# Patient Record
Sex: MALE | Race: WHITE
[De-identification: names, ages, dates, MRNs, and addresses within clinical notes are randomized per-mention and may not be internally consistent; named-entity substitution may affect disease eponyms.]

---

## 2019-10-05 ENCOUNTER — HOSPITAL ENCOUNTER (EMERGENCY)
Dept: HOSPITAL 97 - ER | Age: 29
Discharge: HOME | End: 2019-10-05
Payer: SELF-PAY

## 2019-10-05 VITALS — SYSTOLIC BLOOD PRESSURE: 127 MMHG | DIASTOLIC BLOOD PRESSURE: 90 MMHG | TEMPERATURE: 97.2 F | OXYGEN SATURATION: 100 %

## 2019-10-05 DIAGNOSIS — F14.20: Primary | ICD-10-CM

## 2019-10-05 DIAGNOSIS — Z88.0: ICD-10-CM

## 2019-10-05 DIAGNOSIS — Z88.2: ICD-10-CM

## 2019-10-05 PROCEDURE — 99281 EMR DPT VST MAYX REQ PHY/QHP: CPT

## 2019-10-05 NOTE — EDPHYS
Physician Documentation                                                                           

 [facility]                                                                 

Name: Diogo Shea Jr                                                                              

Age: 28 yrs                                                                                       

Sex: Male                                                                                         

: 1990                                                                                   

MRN: [id number]                                                                                   

Arrival Date: 10/05/2019                                                                          

Time: 08:44                                                                                       

Account#: [id number]                                                                            

Bed 6                                                                                             

Private MD:                                                                                       

ED Physician Alfred Velasquez                                                                         

HPI:                                                                                              

10/05                                                                                             

09:19 This 28 yrs old  Male presents to ER via Ambulatory with complaints of Drug    rn  

      Withdrawal.                                                                                 

09:19 Reports has been using cocaine for about 3 years, increased usage for last few months   rn  

      due to break up, wants to get clean, last use today, feels fine currently but reports       

      intermittent shakes, diarrhea, and malaise. No focal neuro complaints. No fever. Denies     

      suicidal or homicidal thoughts. Called drug line and they recommended checking into         

      rehab, came here for evaluation. Reports doesn't feel like withdrawing but told to say      

      that by help line to try and get admitted. Just wants help quitting. . Onset: The           

      symptoms/episode began/occurred at an unknown time. Severity of symptoms: At their          

      worst the symptoms were mild in the emergency department the symptoms have improved.        

      The patient has experienced similar episodes in the past. The patient has not recently      

      seen a physician.                                                                           

                                                                                                  

Historical:                                                                                       

- Allergies:                                                                                      

09:02 PENICILLINS;                                                                            hb  

09:02 Sulfa (Sulfonamide Antibiotics);                                                        hb  

- Home Meds:                                                                                      

09:02 None [Active];                                                                          hb  

- PMHx:                                                                                           

09:02 None;                                                                                   hb  

- PSHx:                                                                                           

09:02 None;                                                                                   hb  

                                                                                                  

- Immunization history:: Adult Immunizations.                                                     

- Ebola Screening: : Patient denies travel to an Ebola-affected area in the 21 days               

  before illness onset.                                                                           

- Social history:: Smoking status: Patient/guardian denies using tobacco, Patient uses            

  alcohol, on a daily basis. street drugs, cocaine.                                               

- Family history:: not pertinent.                                                                 

- Hospitalizations: : No recent hospitalization is reported.                                      

                                                                                                  

                                                                                                  

ROS:                                                                                              

09:19 Constitutional: Negative for fever, chills, and weight loss, Eyes: Negative for injury, rn  

      pain, redness, and discharge, Neck: Negative for injury, pain, and swelling,                

      Cardiovascular: Negative for chest pain, palpitations, and edema, Respiratory: Negative     

      for shortness of breath, cough, wheezing, and pleuritic chest pain, Abdomen/GI:             

      Negative for abdominal pain, and constipation, MS/Extremity: Negative for injury and        

      deformity, Skin: Negative for injury, rash, and discoloration, Neuro: Negative for          

      headache, weakness, numbness, tingling, and seizure, Psych: Negative for suicide            

      ideation, homicidal ideation, and hallucinations.                                           

                                                                                                  

Exam:                                                                                             

09:19 Constitutional:  This is a well developed, well nourished patient who is awake, alert,  rn  

      and in no acute distress. Head/Face:  Normocephalic, atraumatic. Eyes:  Pupils equal        

      round and reactive to light, extra-ocular motions intact.  Lids and lashes normal.          

      Conjunctiva and sclera are non-icteric and not injected.  Cornea within normal limits.      

      Periorbital areas with no swelling, redness, or edema. ENT:  MMM Cardiovascular:            

      Regular rate and rhythm.  No pulse deficits. Respiratory:  No increased work of             

      breathing, no retractions or nasal flaring. Skin:  Warm, dry with normal turgor.            

      Normal color with no rashes, no lesions, and no evidence of cellulitis. MS/ Extremity:      

      Pulses equal, no cyanosis.  Neurovascular intact.  Full, normal range of motion.  Equal     

      circumference. Neuro:  Awake and alert, GCS 15, oriented to person, place, time, and        

      situation.  Cranial nerves II-XII grossly intact.  Motor strength 5/5 in all                

      extremities.  Sensory grossly intact.  Cerebellar exam normal.  Normal gait. Psych:         

      Awake, alert, with orientation to person, place and time.  Behavior, mood, and affect       

      are within normal limits.                                                                   

                                                                                                  

Vital Signs:                                                                                      

09:02  / 90; Pulse 84; Resp 16; Temp 97.2; Pulse Ox 100% ; Weight 102.06 kg; Height 6   hb  

      ft. 1 in. (185.42 cm); Pain 5/10;                                                           

09:02 Body Mass Index 29.68 (102.06 kg, 185.42 cm)                                            hb  

                                                                                                  

MDM:                                                                                              

08:57 Patient medically screened.                                                             rn  

09:19 Differential Diagnosis drug withdrawal, drug dependency . Data reviewed: vital signs,   rn  

      nurses notes, and as a result, I will discharge patient. Counseling: I had a detailed       

      discussion with the patient and/or guardian regarding: the historical points, exam          

      findings, and any diagnostic results supporting the discharge/admit diagnosis, the need     

      for outpatient follow up, to return to the emergency department if symptoms worsen or       

      persist or if there are any questions or concerns that arise at home. Special               

      discussion: I discussed with the patient/guardian in detail that at this point there is     

      no indication for admission to the hospital. It is understood, however, that if the         

      symptoms persist or worsen the patient needs to return immediately for re-evaluation.       

      ED course: Pt with normal vital signs, no signs or symptoms of withdrawal, just used        

      today, and no signs of acute intoxication either. Recommended cessation of drug use and     

      checking in to drug rehab. Medically screened because nothing we can do for this            

      gentleman here. Again, denies suicidal or homicidal ideation. .                             

                                                                                                  

Administered Medications:                                                                         

No medications were administered                                                                  

                                                                                                  

                                                                                                  

Disposition:                                                                                      

10/05/19 09:28 Discharged to Home as Medical Screen. Impression: Cocaine dependence, Cocaine      

  abuse, uncomplicated.                                                                           

- Condition is Stable.                                                                            

- Discharge Instructions: Chemical Dependency, Stimulant Use Disorder-Cocaine.                    

                                                                                                  

- Medication Reconciliation Form, Thank You Letter, Antibiotic Education, Prescription            

  Opioid Use form.                                                                                

- Follow up: Private Physician; When: As needed; Reason: Recheck today's complaints,              

  Re-evaluation by your physician.                                                                

- Problem is an ongoing problem.                                                                  

- Symptoms have improved.                                                                         

                                                                                                  

                                                                                                  

                                                                                                  

Signatures:                                                                                       

Alfred Velasquez MD MD rn Baxter, Heather, RN RN                                                      

Porsche Armstrong RN RN   tw2                                                  

                                                                                                  

Corrections: (The following items were deleted from the chart)                                    

09: 09:28 10/05/2019 09:28 Discharged to Home. Impression: Cocaine dependence; Cocaine      rn  

      abuse, uncomplicated. Condition is Stable. Forms are Medication Reconciliation Form,        

      Thank You Letter, Antibiotic Education, Prescription Opioid Use. Follow up: Private         

      Physician; When: As needed; Reason: Recheck today's complaints, Re-evaluation by your       

      physician. Problem is an ongoing problem. Symptoms have improved. rn                        

09:39 09:28 10/05/2019 09:28 Discharged to Home as Medical Screen. Impression: Cocaine        tw2 

      dependence; Cocaine abuse, uncomplicated. Condition is Stable. Discharge Instructions:      

      Chemical Dependency, Stimulant Use Disorder-Cocaine. Forms are Medication                   

      Reconciliation Form, Thank You Letter, Antibiotic Education, Prescription Opioid Use.       

      Follow up: Private Physician; When: As needed; Reason: Recheck today's complaints,          

      Re-evaluation by your physician. Problem is an ongoing problem. Symptoms have improved.     

      rn                                                                                          

                                                                                                  

**************************************************************************************************

## 2019-10-05 NOTE — ER
Nurse's Notes                                                                                     

 [facility]                                                                 

Name: Diogo Shea Jr                                                                              

Age: 28 yrs                                                                                       

Sex: Male                                                                                         

: 1990                                                                                   

MRN: [id number]                                                                                   

Arrival Date: 10/05/2019                                                                          

Time: 08:44                                                                                       

Account#: [id number]                                                                            

Bed 6                                                                                             

Private MD:                                                                                       

Diagnosis: Cocaine dependence;Cocaine abuse, uncomplicated                                        

                                                                                                  

Presentation:                                                                                     

10/05                                                                                             

08:57 Presenting complaint: Has been using 2 grams of cocaine per day for 6 months, last used hb  

      2 hrs PTA. Also reports being awake since 0330 yesterday, has consumed >20 beers            

      yesterday/last night.                                                                       

08:57 Transition of care: patient was not received from another setting of care. Onset of     hb  

      symptoms was 2019.                                                              

08:57 Method Of Arrival: Ambulatory                                                             

08:57 Acuity: BETSEY 3                                                                           hb  

08:58 Risk Assessment: Do you want to hurt yourself or someone else? Patient reports no       tw2 

      desire to harm self or others. Initial Sepsis Screen: Does the patient meet any 2           

      criteria? No. Patient's initial sepsis screen is negative. Does the patient have a          

      suspected source of infection? No. Patient's initial sepsis screen is negative. Care        

      prior to arrival: None.                                                                     

                                                                                                  

Historical:                                                                                       

- Allergies:                                                                                      

09:02 PENICILLINS;                                                                            hb  

09:02 Sulfa (Sulfonamide Antibiotics);                                                        hb  

- Home Meds:                                                                                      

09:02 None [Active];                                                                          hb  

- PMHx:                                                                                           

09:02 None;                                                                                   hb  

- PSHx:                                                                                           

09:02 None;                                                                                   hb  

                                                                                                  

- Immunization history:: Adult Immunizations.                                                     

- Ebola Screening: : Patient denies travel to an Ebola-affected area in the 21 days               

  before illness onset.                                                                           

- Social history:: Smoking status: Patient/guardian denies using tobacco, Patient uses            

  alcohol, on a daily basis. street drugs, cocaine.                                               

- Family history:: not pertinent.                                                                 

- Hospitalizations: : No recent hospitalization is reported.                                      

                                                                                                  

                                                                                                  

Screenin:58 Abuse screen: Denies threats or abuse. Nutritional screening: No deficits noted.        tw2 

      Tuberculosis screening: No symptoms or risk factors identified. Fall Risk None              

      identified.                                                                                 

                                                                                                  

Assessment:                                                                                       

09:03 General: Appears in no apparent distress. Behavior is calm, cooperative. Pain:          hb  

      Complains of pain in right arm and left arm Pain currently is 5 out of 10 on a pain         

      scale. Neuro: Level of Consciousness is awake, alert, obeys commands, Oriented to           

      person, place, time, situation. Cardiovascular: Heart tones S1 S2 present Capillary         

      refill < 3 seconds Patient's skin is warm and dry. Respiratory: Airway is patent            

      Respiratory effort is even, unlabored, Respiratory pattern is regular, symmetrical,         

      Breath sounds are clear bilaterally. GI: No signs and/or symptoms were reported             

      involving the gastrointestinal system. : No signs and/or symptoms were reported           

      regarding the genitourinary system. EENT: No signs and/or symptoms were reported            

      regarding the EENT system. Derm: Skin is intact, is healthy with good turgor, Skin is       

      pink, warm \T\ dry. Musculoskeletal: No signs and/or symptoms reported regarding the        

      musculoskeletal system.                                                                     

                                                                                                  

Vital Signs:                                                                                      

09:02  / 90; Pulse 84; Resp 16; Temp 97.2; Pulse Ox 100% ; Weight 102.06 kg; Height 6   hb  

      ft. 1 in. (185.42 cm); Pain 5/10;                                                           

09:02 Body Mass Index 29.68 (102.06 kg, 185.42 cm)                                            hb  

                                                                                                  

ED Course:                                                                                        

08:44 Patient arrived in ED.                                                                  as  

08:57 Alfred Velasquez MD is Attending Physician.                                                rn  

08:58 Arm band placed on.                                                                     tw2 

08:58 Bed in low position. Call light in reach.                                               tw2 

09:00 Susan Bernardo, RN is Primary Nurse.                                                   hb  

09:02 Triage completed.                                                                       hb  

09:37 No provider procedures requiring assistance completed. Patient did not have IV access   hb  

      during this emergency room visit.                                                           

                                                                                                  

Administered Medications:                                                                         

No medications were administered                                                                  

                                                                                                  

                                                                                                  

Outcome:                                                                                          

09:28 Discharge ordered by MD.                                                                rn  

09:37 Medical screen evaluation completed per provider. Patient declined treatment.           hb  

09:37 Condition: stable                                                                           

09:37 Instructed on follow up and referral plans. Demonstrated understanding of instructions.     

09:39 Patient left the ED.                                                                    tw2 

                                                                                                  

Signatures:                                                                                       

Mabel Melendez Roman, MD MD rn Baxter, Heather, RN                     [facility]                                                      

Porsche Armstrong RN RN   tw2                                                  

                                                                                                  

**************************************************************************************************

## 2020-01-11 ENCOUNTER — HOSPITAL ENCOUNTER (EMERGENCY)
Dept: HOSPITAL 97 - ER | Age: 30
Discharge: HOME | End: 2020-01-11
Payer: SELF-PAY

## 2020-01-11 VITALS — DIASTOLIC BLOOD PRESSURE: 88 MMHG | SYSTOLIC BLOOD PRESSURE: 120 MMHG

## 2020-01-11 VITALS — TEMPERATURE: 98.2 F

## 2020-01-11 DIAGNOSIS — Z88.0: ICD-10-CM

## 2020-01-11 DIAGNOSIS — Z72.0: ICD-10-CM

## 2020-01-11 DIAGNOSIS — Z88.2: ICD-10-CM

## 2020-01-11 DIAGNOSIS — F14.10: Primary | ICD-10-CM

## 2020-01-11 LAB
ALBUMIN SERPL BCP-MCNC: 5 G/DL (ref 3.4–5)
ALP SERPL-CCNC: 52 U/L (ref 45–117)
ALT SERPL W P-5'-P-CCNC: 60 U/L (ref 12–78)
AST SERPL W P-5'-P-CCNC: 22 U/L (ref 15–37)
BUN BLD-MCNC: 6 MG/DL (ref 7–18)
GLUCOSE SERPLBLD-MCNC: 136 MG/DL (ref 74–106)
HCT VFR BLD CALC: 46.4 % (ref 39.6–49)
INR BLD: 1.05
LYMPHOCYTES # SPEC AUTO: 1.4 K/UL (ref 0.7–4.9)
METHAMPHET UR QL SCN: NEGATIVE
PMV BLD: 9.1 FL (ref 7.6–11.3)
POTASSIUM SERPL-SCNC: 3.3 MMOL/L (ref 3.5–5.1)
RBC # BLD: 5.3 M/UL (ref 4.33–5.43)
THC SERPL-MCNC: NEGATIVE NG/ML
TROPONIN (EMERG DEPT USE ONLY): < 0.02 NG/ML (ref 0–0.04)
TSH SERPL DL<=0.05 MIU/L-ACNC: 2.21 UIU/ML (ref 0.36–3.74)

## 2020-01-11 PROCEDURE — 36415 COLL VENOUS BLD VENIPUNCTURE: CPT

## 2020-01-11 PROCEDURE — 80307 DRUG TEST PRSMV CHEM ANLYZR: CPT

## 2020-01-11 PROCEDURE — 84484 ASSAY OF TROPONIN QUANT: CPT

## 2020-01-11 PROCEDURE — 80329 ANALGESICS NON-OPIOID 1 OR 2: CPT

## 2020-01-11 PROCEDURE — 84443 ASSAY THYROID STIM HORMONE: CPT

## 2020-01-11 PROCEDURE — 93005 ELECTROCARDIOGRAM TRACING: CPT

## 2020-01-11 PROCEDURE — 96360 HYDRATION IV INFUSION INIT: CPT

## 2020-01-11 PROCEDURE — 80076 HEPATIC FUNCTION PANEL: CPT

## 2020-01-11 PROCEDURE — 85610 PROTHROMBIN TIME: CPT

## 2020-01-11 PROCEDURE — 85730 THROMBOPLASTIN TIME PARTIAL: CPT

## 2020-01-11 PROCEDURE — 99285 EMERGENCY DEPT VISIT HI MDM: CPT

## 2020-01-11 PROCEDURE — 85025 COMPLETE CBC W/AUTO DIFF WBC: CPT

## 2020-01-11 PROCEDURE — 80320 DRUG SCREEN QUANTALCOHOLS: CPT

## 2020-01-11 PROCEDURE — 80048 BASIC METABOLIC PNL TOTAL CA: CPT

## 2020-01-11 PROCEDURE — 81003 URINALYSIS AUTO W/O SCOPE: CPT

## 2020-01-11 NOTE — EDPHYS
Physician Documentation                                                                           

 [facility]                                                                 

Name: Diogo Shea Jr                                                                              

Age: 29 yrs                                                                                       

Sex: Male                                                                                         

: 1990                                                                                   

MRN: [id number]                                                                                   

Arrival Date: 2020                                                                          

Time: 13:29                                                                                       

Account#: [id number]                                                                            

Bed 26                                                                                            

Private MD: None, None                                                                            

ED Physician Alfred Velasquez                                                                         

HPI:                                                                                              

                                                                                             

13:41 This 29 yrs old  Male presents to ER via EMS with complaints of Drug Abuse.    jmm 

13:41 The patient presents with a history of heart racing. Onset: The symptoms/episode        jmm 

      began/occurred acutely, 1 hour(s) ago. Duration: The patient or guardian reports a          

      single episode, that is still ongoing, but improving. Modifying factors: The symptoms       

      are aggravated by substance abuse cocaine. Associated signs and symptoms: Pertinent         

      negatives: chest pain, SOB. This is a 29 year old male with nmo chronic medical             

      conditions that presents to the ED with complaints of palpitations beginning approx 1       

      hour ago. Patient admits to cocaine use. Denies abdominal pain, vomiting. .                 

                                                                                                  

Historical:                                                                                       

- Allergies:                                                                                      

13:33 PENICILLINS;                                                                            sg  

13:33 Sulfa (Sulfonamide Antibiotics);                                                        sg  

- PMHx:                                                                                           

13:33 None;                                                                                   sg  

- PSHx:                                                                                           

13:33 None;                                                                                   sg  

                                                                                                  

- Immunization history:: Adult Immunizations up to date.                                          

- Social history:: Smoking status: Patient uses tobacco products, Patient/guardian                

  denies using Cocaine ( on the weekends).                                                        

- Ebola Screening: : Patient negative for fever greater than or equal to 101.5 degrees            

  Fahrenheit, and additional compatible Ebola Virus Disease symptoms Patient denies               

  exposure to infectious person Patient denies travel to an Ebola-affected area in the            

  21 days before illness onset No symptoms or risks identified at this time.                      

                                                                                                  

                                                                                                  

ROS:                                                                                              

13:41 Constitutional: Negative for fever, chills, and weight loss.                            jmm 

13:41 Abdomen/GI: Negative for abdominal pain, nausea, vomiting, diarrhea, and constipation,      

      Back: Negative for injury and pain, Neuro: Negative for headache, weakness, numbness,       

      tingling, and seizure.                                                                      

13:41 Cardiovascular: Positive for palpitations.                                                  

13:41 All other systems are negative.                                                             

                                                                                                  

Exam:                                                                                             

13:41 Head/Face:  atraumatic. Eyes:  EOMI, no conjunctival erythema appreciated ENT:  Moist   jmm 

      Mucus Membranes Neck:  Trachea midline, Supple Chest/axilla:  Normal chest wall             

      appearance and motion.   Cardiovascular:  Regular rate and rhythm.  No edema                

      appreciated Respiratory:  Normal respirations, no respiratory distress appreciated          

      Abdomen/GI:  Non distended, soft Back:  Normal ROM Skin:  General appearance color          

      normal MS/ Extremity:  Moves all extremities, no obvious deformities appreciated, no        

      edema noted to the lower extremities  Neuro:  Awake and alert, normal gait Psych:           

      Behavior is normal, Mood is normal, Patient is cooperative and pleasant                     

13:41 Constitutional: The patient appears alert, awake, anxious.                                  

                                                                                                  

Vital Signs:                                                                                      

13:34  / 90; Pulse 113; Resp 16 S; Temp 97.7; Pulse Ox 100% ;                           sg  

13:59  / 86; Pulse 92; Resp 17; Pulse Ox 100% on R/A;                                   sg  

14:20  / 71; Pulse 87; Resp 16; Pulse Ox 98% on R/A;                                    sg  

14:30  / 71 LA (auto/lg); Pulse 89; Resp 18; Pulse Ox 99% on R/A;                       jp3 

15:02  / 76; Pulse 55; Resp 16 S; Pulse Ox 99% on R/A;                                  jp3 

15:34  / 88; Pulse 78; Resp 18; Pulse Ox 99% ;                                          jp3 

                                                                                                  

MDM:                                                                                              

13:39 Patient medically screened.                                                             [facility] 

15:55 Data reviewed: vital signs, nurses notes. Counseling: I had a detailed discussion with  shira 

      the patient and/or guardian regarding: the historical points, exam findings, and any        

      diagnostic results supporting the discharge/admit diagnosis, lab results, the need for      

      outpatient follow up, to return to the emergency department if symptoms worsen or           

      persist or if there are any questions or concerns that arise at home. ED course:            

      Patient is alert and non toxic in appearance in the ED. Patient states he feels much        

      better. Advised to d/c cocaine use. Patient otherwise given strict return precautions.      

      Patient understood and agrees with the plan of care. .                                      

                                                                                                  

                                                                                             

13:39 Order name: Acetaminophen; Complete Time: 15:03                                         [facility] 

                                                                                             

13:39 Order name: Basic Metabolic Panel; Complete Time: 15:03                                 [facility] 

                                                                                             

13:39 Order name: CBC with Diff; Complete Time: 14:36                                         [facility] 

                                                                                             

13:39 Order name: ETOH Level; Complete Time: 14:36                                            [facility] 

                                                                                             

13:39 Order name: Hepatic Function; Complete Time: 15:03                                      [facility] 

                                                                                             

13:39 Order name: PT-INR; Complete Time: 14:36                                                [facility] 

                                                                                             

13:39 Order name: Ptt, Activated; Complete Time: 14:36                                        [facility] 

                                                                                             

13:39 Order name: Salicylate; Complete Time: 14:36                                            [facility] 

                                                                                             

13:39 Order name: Urine Drug Screen; Complete Time: 15:16                                     [facility] 

                                                                                             

13:39 Order name: EKG; Complete Time: 13:40                                                   [facility] 

                                                                                             

13:39 Order name: Troponin (emerg Dept Use Only); Complete Time: 15:03                        [facility] 

                                                                                             

13:39 Order name: TSH; Complete Time: 15:03                                                   [facility] 

                                                                                             

14:58 Order name: Urine Dipstick--Ancillary (enter results); Complete Time: 15:22               

                                                                                             

13:39 Order name: EKG - Nurse/Tech; Complete Time: 14:01                                      [facility] 

                                                                                             

13:39 Order name: IV Saline Lock; Complete Time: 14:                                        [facility] 

                                                                                             

13:39 Order name: Labs collected and sent; Complete Time: 14:                               [facility] 

                                                                                             

13:39 Order name: Urine Dipstick-Ancillary (obtain specimen); Complete Time: 14:51            [facility] 

                                                                                                  

Administered Medications:                                                                         

14:00 Drug: NS 0.9% 1000 ml Route: IV; Rate: 1 bolus; Site: right antecubital;                sg  

15:00 Follow up: Response: No adverse reaction; IV Status: Completed infusion; IV Intake:     sg  

      1000ml                                                                                      

                                                                                                  

                                                                                                  

Disposition:                                                                                      

17:07 Co-signature as Attending Physician, Alfred Velasquez MD.                                    rn  

                                                                                                  

Disposition:                                                                                      

20 15:56 Discharged to Home. Impression: Palpitations, Drug Abuse.                          

- Condition is Stable.                                                                            

- Discharge Instructions: Stimulant Use Disorder-Cocaine, Palpitations.                           

                                                                                                  

- Medication Reconciliation Form, Thank You Letter, Antibiotic Education, Prescription            

  Opioid Use form.                                                                                

- Follow up: Private Physician; When: 2 - 3 days; Reason: Recheck today's complaints,             

  Continuance of care, Re-evaluation by your physician.                                           

                                                                                                  

                                                                                                  

                                                                                                  

Signatures:                                                                                       

Dispatcher [employer]                           Ponce Velasco RN                         RN   sg                                                   

Niraj Wadsworth PA PA jmm Nieto, Roman, MD MD   rn                                                   

                                                                                                  

Corrections: (The following items were deleted from the chart)                                    

16:05 15:56 2020 15:56 Discharged to Home. Impression: Palpitations; Drug Abuse.        sg  

      Condition is Stable. Forms are Medication Reconciliation Form, Thank You Letter,            

      Antibiotic Education, Prescription Opioid Use. Follow up: Private Physician; When: 2 -      

      3 days; Reason: Recheck today's complaints, Continuance of care, Re-evaluation by your      

      physician. shira                                                                              

                                                                                                  

**************************************************************************************************

## 2020-01-11 NOTE — XMS REPORT
Patient Summary Document

 Created on:2020



Patient:GERMÁN THRASHER

Sex:Male

:1990

External Reference #:[id number]





Demographics







 Address  [address] [place] [address]

 

 Home Phone  [phone]

 

 Work Phone  [phone]

 

 Email Address  NONE

 

 Preferred Language  Unknown

 

 Marital Status  Unknown

 

 Protestant Affiliation  Unknown

 

 Race  Unknown

 

 Additional Race(s)  Unavailable

 

 Ethnic Group  Unknown









Author







 Organization  [facility]

 

 Address  [address] [place] [address] [address]



   [place] [address]

 

 Phone  [phone]









Care Team Providers







 Name  Role  Phone

 

 Unavailable  Unavailable  Unavailable









Problems

This patient has no known problems.



Allergies, Adverse Reactions, Alerts

This patient has no known allergies or adverse reactions.



Medications

This patient has no known medications.

## 2020-01-11 NOTE — ER
Nurse's Notes                                                                                     

 [facility]                                                                 

Name: Diogo Shea Jr                                                                              

Age: 29 yrs                                                                                       

Sex: Male                                                                                         

: 1990                                                                                   

MRN: [id number]                                                                                   

Arrival Date: 2020                                                                          

Time: 13:29                                                                                       

Account#: [id number]                                                                            

Bed 26                                                                                            

Private MD: None, None                                                                            

Diagnosis: Palpitations;Drug Abuse                                                                

                                                                                                  

Presentation:                                                                                     

                                                                                             

13:30 Presenting complaint: EMS states: pt repoted doing 1/2 of an 8 sack with a friend,      sg  

      reports feeling his heart rate in his chest, reports feeling anxioius. EMS state last       

      ingestion of cocaine occurred around 0800 this morning per the pt. Transition of care:      

      patient was not received from another setting of care. Onset of symptoms was 2020. Risk Assessment: Do you want to hurt yourself or someone else? Patient            

      reports no desire to harm self or others. Initial Sepsis Screen: Does the patient meet      

      any 2 criteria? HR > 90 bpm. Does the patient have a suspected source of infection? No.     

      Patient's initial sepsis screen is negative. Care prior to arrival: None.                   

13:30 Method Of Arrival: EMS: [facility]                                                      

13:30 Acuity: BETSEY 2                                                                           sg  

                                                                                                  

Historical:                                                                                       

- Allergies:                                                                                      

13:33 PENICILLINS;                                                                            sg  

13:33 Sulfa (Sulfonamide Antibiotics);                                                        sg  

- PMHx:                                                                                           

13:33 None;                                                                                   sg  

- PSHx:                                                                                           

13:33 None;                                                                                   sg  

                                                                                                  

- Immunization history:: Adult Immunizations up to date.                                          

- Social history:: Smoking status: Patient uses tobacco products, Patient/guardian                

  denies using Cocaine ( on the weekends).                                                        

- Ebola Screening: : Patient negative for fever greater than or equal to 101.5 degrees            

  Fahrenheit, and additional compatible Ebola Virus Disease symptoms Patient denies               

  exposure to infectious person Patient denies travel to an Ebola-affected area in the            

  21 days before illness onset No symptoms or risks identified at this time.                      

                                                                                                  

                                                                                                  

Screenin:33 Abuse screen: Denies threats or abuse. Denies injuries from another. Nutritional        sg  

      screening: No deficits noted. Tuberculosis screening: No symptoms or risk factors           

      identified. Fall Risk None identified.                                                      

                                                                                                  

Assessment:                                                                                       

13:33 General: Appears in no apparent distress. well groomed, well developed, well nourished. sg  

      Pain: Denies pain. Neuro: Level of Consciousness is awake, alert, obeys commands,           

      Oriented to person, place, time,  are equal bilaterally Speech is normal, Facial       

      symmetry appears normal. Cardiovascular: Patient's skin is warm and dry. Chest pain is      

      denied. Cardiovascular: Reports palpitations. Respiratory: Airway is patent Respiratory     

      effort is even, unlabored, Respiratory pattern is regular, symmetrical. GI: No signs        

      and/or symptoms were reported involving the gastrointestinal system. : No signs           

      and/or symptoms were reported regarding the genitourinary system. EENT: No signs and/or     

      symptoms were reported regarding the EENT system. Derm: Skin is pink, warm \T\ dry.         

      Musculoskeletal: Circulation, motion, and sensation intact. Range of motion: intact in      

      all extremities.                                                                            

                                                                                                  

Vital Signs:                                                                                      

13:34  / 90; Pulse 113; Resp 16 S; Temp 97.7; Pulse Ox 100% ;                           sg  

13:59  / 86; Pulse 92; Resp 17; Pulse Ox 100% on R/A;                                   sg  

14:20  / 71; Pulse 87; Resp 16; Pulse Ox 98% on R/A;                                    sg  

14:30  / 71 LA (auto/lg); Pulse 89; Resp 18; Pulse Ox 99% on R/A;                       jp3 

15:02  / 76; Pulse 55; Resp 16 S; Pulse Ox 99% on R/A;                                  jp3 

15:34  / 88; Pulse 78; Resp 18; Pulse Ox 99% ;                                          jp3 

                                                                                                  

ED Course:                                                                                        

13:29 Patient arrived in ED.                                                                  sg  

13:30 None, None is Private Physician.                                                        sg  

13[facility]32 [facility]                                                              [facility] 

13:32 Alfred Velasquez MD is Attending Physician.                                                [facility] 

13:32 Triage completed.                                                                       sg  

13:32 Arm band placed on.                                                                     sg  

13:40 EKG done, by ED staff, reviewed by Niraj GLEZ. Patient maintains SpO2 saturation   jp3 

      greater than 95% on room air.                                                               

13:42 Safety checks: Family/friend present: yes. Bed in low position. Call light in reach.    jp3 

      Side rails up X 1. Verbal reassurance given. Cardiac monitor on. Pulse ox on. NIBP on.      

13:59 Ponce Dickerson, RN is Primary Nurse.                                                       sg  

14:00 Initial lab(s) drawn, by me, sent to lab. Inserted saline lock: 20 gauge in right       jp3 

      antecubital area, using aseptic technique. Blood collected.                                 

14:01 Warm blanket given.                                                                     jp3 

14:57 Urine collected: clean catch specimen, clear, anne colored, Legal drug screen obtained jp3 

      per protocol.                                                                               

14:57 Urine Drug Screen Sent.                                                                 jp3 

16:04 Removal of peripheral IV. Catheter intact, dressing applied.                            jp3 

                                                                                                  

Administered Medications:                                                                         

14:00 Drug: NS 0.9% 1000 ml Route: IV; Rate: 1 bolus; Site: right antecubital;                sg  

15:00 Follow up: Response: No adverse reaction; IV Status: Completed infusion; IV Intake:     sg  

      1000ml                                                                                      

                                                                                                  

                                                                                                  

Intake:                                                                                           

15:00 IV: 1000ml; Total: 1000ml.                                                              sg  

                                                                                                  

Outcome:                                                                                          

15:56 Discharge ordered by MD.                                                                shira 

16:05 Patient left the ED.                                                                    sg  

                                                                                                  

Signatures:                                                                                       

oPnce Dickerson RN                         RN   Niraj Pineda PA PA jmm Pisarski, Jacob                              jp3                                                  

                                                                                                  

**************************************************************************************************

## 2020-01-12 VITALS — OXYGEN SATURATION: 99 %

## 2020-01-12 NOTE — EKG
Test Date:    2020-01-11               Test Time:    13:35:37

Technician:   BRIAN                                    

                                                     

MEASUREMENT RESULTS:                                       

Intervals:                                           

Rate:         108                                    

UT:           164                                    

QRSD:         100                                    

QT:           340                                    

QTc:          455                                    

Axis:                                                

P:            61                                     

UT:           164                                    

QRS:          27                                     

T:            63                                     

                                                     

INTERPRETIVE STATEMENTS:                                       

                                                     

Sinus tachycardia

Nonspecific T wave abnormality

Abnormal ECG

No previous ECG available for comparison



Electronically Signed On 01-12-20 06:34:03 CST by Bowen Danielle

## 2020-07-26 ENCOUNTER — HOSPITAL ENCOUNTER (EMERGENCY)
Dept: HOSPITAL 97 - ER | Age: 30
LOS: 1 days | Discharge: HOME | End: 2020-07-27
Payer: SELF-PAY

## 2020-07-26 DIAGNOSIS — F14.10: Primary | ICD-10-CM

## 2020-07-26 DIAGNOSIS — Z88.0: ICD-10-CM

## 2020-07-26 DIAGNOSIS — F17.210: ICD-10-CM

## 2020-07-26 DIAGNOSIS — Z88.2: ICD-10-CM

## 2020-07-26 LAB
HCT VFR BLD CALC: 43.5 % (ref 39.6–49)
INR BLD: 0.91
LYMPHOCYTES # SPEC AUTO: 1.4 K/UL (ref 0.7–4.9)
PMV BLD: 9.2 FL (ref 7.6–11.3)
RBC # BLD: 4.95 M/UL (ref 4.33–5.43)

## 2020-07-26 PROCEDURE — 80307 DRUG TEST PRSMV CHEM ANLYZR: CPT

## 2020-07-26 PROCEDURE — 83880 ASSAY OF NATRIURETIC PEPTIDE: CPT

## 2020-07-26 PROCEDURE — 84484 ASSAY OF TROPONIN QUANT: CPT

## 2020-07-26 PROCEDURE — 80048 BASIC METABOLIC PNL TOTAL CA: CPT

## 2020-07-26 PROCEDURE — 36415 COLL VENOUS BLD VENIPUNCTURE: CPT

## 2020-07-26 PROCEDURE — 85025 COMPLETE CBC W/AUTO DIFF WBC: CPT

## 2020-07-26 PROCEDURE — 83735 ASSAY OF MAGNESIUM: CPT

## 2020-07-26 PROCEDURE — 93005 ELECTROCARDIOGRAM TRACING: CPT

## 2020-07-26 PROCEDURE — 81003 URINALYSIS AUTO W/O SCOPE: CPT

## 2020-07-26 PROCEDURE — 99285 EMERGENCY DEPT VISIT HI MDM: CPT

## 2020-07-26 PROCEDURE — 85610 PROTHROMBIN TIME: CPT

## 2020-07-26 PROCEDURE — 80076 HEPATIC FUNCTION PANEL: CPT

## 2020-07-26 PROCEDURE — 71045 X-RAY EXAM CHEST 1 VIEW: CPT

## 2020-07-26 NOTE — XMS REPORT
Clinical Summary

                            Created on:2020



Patient:Diogo Shea

Sex:Male

:1990

External Reference #:[id number]





Demographics







                          Address                   [address] [address]

 

                          Home Phone                [phone]

 

                          Email Address             [email or web address]

 

                          Preferred Language        English

 

                          Marital Status            Single

 

                          Restoration Affiliation     Unknown

 

                          Race                      Unknown

 

                          Ethnic Group              Not  or 









Author







                          Organization              [facility]

 

                          Address                   [address] [place] [id number]









Support







                Name            Relationship    Address         Phone

 

                Contact No      Unavailable     Unavailable     [phone]









Care Team Providers







                    Name                Role                Phone

 

                    Doris Green MD Primary Care Provider [phone]









Allergies







             Active Allergy Reactions    Severity     Noted Date   Comments

 

             Penicillins  Swelling                  04/10/2017   

 

             Sulfa (Sulfonamide Antibiotics) Anaphylaxis  High         04/10/201

7   







Medications







          Medication Sig       Dispensed Refills   Start Date End Date  Status

 

          lamoTRIgine (LaMICtal) Take 25 mg by           0                      

       Active



          25 MG tablet mouth 2 (two)                                         



                    times a day.                                         

 

          venlafaxine (EFFEXOR) Take 50 mg by           0                       

      Active



          50 MG tablet mouth 2 (two)                                         



                    times a day.                                         







Active Problems







                          Problem                   Noted Date

 

                          Weakness                  04/10/2017

 

                          Chronic bilateral low back pain without sciatica 04/10

/2017

 

                          Bipolar depression        







Family History







                Medical History Relation        Name            Comments

 

                No Known Problems Brother                         

 

                Hypertension    Father                          

 

                Fibromyalgia    Mother                          

 

                Other           Sister                          car accident









                Relation        Name            Status          Comments

 

                Brother                         Alive           

 

                Father                          Alive           

 

                Mother                          Alive           

 

                Sister                                  







Social History







             Tobacco Use  Types        Packs/Day    Years Used   Date

 

             Never Smoker                                        









                Smokeless Tobacco: Never Used                                 









                Alcohol Use     Drinks/Week     oz/Week         Comments

 

                Yes                                             









                          Sex Assigned at Birth     Date Recorded

 

                          Not on file               









                    Job Start Date      Occupation          Industry

 

                    Not on file         Not on file         Not on file









                    Travel History      Travel Start        Travel End









                                        No recent travel history available.







Last Filed Vital Signs

Not on file



Plan of Treatment







                Health Maintenance Due Date        Last Done       Comments

 

                INFLUENZA VACCINE 2020                      







Results

Not on fileafter 2019



Insurance







          Payer     Benefit Plan / Group Subscriber ID Effective Dates Phone    

 Address   Type

 

          [facility] x[facility] 2017-Present                 

    HMO/PPO



                    CHOICE/CHOICE +                                         









           Guarantor Name Account Type Relation to Date of    Phone      Billing

 Address



                                 Patient    Birth                 

 

           Diogo Shea Personal/Family Self       1990 [phone]

[place]



                                                       (Home)     [address]



                                                                  [id number]







Advance Directives

For more information, please contact: [phone]





                Type            Date Recorded   Patient Representative Explanati

on

 

                Advance Directives, Living Will and                             

    



                Medical Power of

## 2020-07-26 NOTE — XMS REPORT
Summary of Care

                             Created on:July 3, 2020



Patient:Diogo Shea Jr.

Sex:Male

:1990

External Reference #:[id number]





Demographics







                          Address                   [address]



                                                    [place] [address]

 

                          Mobile Phone              [phone]

 

                          Home Phone                [phone]

 

                          Work Phone                [phone]

 

                          Email Address             [email or web address]

 

                          Preferred Language        English

 

                          Marital Status            Single

 

                          Jew Affiliation     Unknown

 

                          Race                      White

 

                          Ethnic Group              Not  or 









Author







                          Organization              [facility]

 

                          Address                   [address] [place] [address]









Support







                Name            Relationship    Address         Phone

 

                Zulma Shea   Unavailable     [address] [facility] [employer] [phone]



                                                [place] [address] 









Care Team Providers







                    Name                Role                Phone

 

                    Pcp,  Does Not Have A Primary Care Provider [phone]









Reason for Visit







                          Reason                    Comments

 

                          LAB                       







Encounter Details







             Date         Type         Department   Care Team    Description

 

                2020      Laboratory Only [facility] FAMILY Ross Ortega MD



[address] 



[place] [address] [phone] (Fax)                      Suspected 2019 [facility]



                                                            MEDICINE



                                        



Nurse, [facility]        Coronavirus



                                       [address]              Infection ([facility]



                                                            [address]IVE, [address]



                                                    Dx)



                                       [address]              



                                                            [address]



                                                    



                                       [place]              



                                                            [address] [phone]              







Allergies







             Active Allergy Reactions    Severity     Noted Date   Comments

 

             Penicillins  Swelling                  2015   

 

             Sulfa (Sulfonamide Antibiotics) Swelling                  

5   

 

             Hydrocodone-Acetaminophen Swelling                  2015   



documented as of this encounter (statuses as of 2020)



Medications







          Medication Sig       Dispensed Refills   Start Date End Date  Status

 

          lamoTRIgine 25 mg Take 25 mg by           0                           

  Active



          tablet    mouth 3 (three)                                         



                    times daily.                                         

 

          venlafaxine 37.5 mg Take 37.5 mg by           0                       

      Active



          tablet    mouth every                                         



                    morning.                                          

 

          traMADol (ULTRAM) 50 mg Take 1 tablet by 9 tablet  0         

9           Active



          tabletIndications: mouth every 8                                      

   



          Toothache (eight) hours as                                         



                    needed for Pain                                         



                    (scale 4-6).                                         



documented as of this encounter (statuses as of 2020)



Active Problems

No known active problemsdocumented as of this encounter (statuses as of 
2020)



Social History







             Tobacco Use  Types        Packs/Day    Years Used   Date

 

             Never Assessed                                        









                          Sex Assigned at Birth     Date Recorded

 

                          Not on file               









                    Job Start Date      Occupation          Industry

 

                    Not on file         Not on file         Not on file









                    Travel History      Travel Start        Travel End









                                        No recent travel history available.









                    COVID-19 Exposure   Response            Date Recorded

 

                    In the last month, have you been in contact with No / Unsure

         7/3/2020  4:12 PM 

CDT



                    someone who was confirmed or suspected to have              

       



                    Coronavirus / COVID-19?                     



documented as of this encounter



Last Filed Vital Signs

Not on filedocumented in this encounter



Plan of Treatment







             Name         Type         Priority     Associated Diagnoses Order S

chedusonny

 

             COVID-19 (PCR MOLECULAR LAB          Routine      Suspected 2019 No

sujatha Expected: 2020,



             TESTING)                               Coronavirus Infection 

s: 2021









                Health Maintenance Due Date        Last Done       Comments

 

                VARICELLA VACCINES (1 of 2 - 1993                      



                2-dose childhood series)                                 

 

                DTaP,Tdap,and Td Vaccines (1 - 2003                      



                Tdap)                                           

 

                Depression Screening 2004                      

 

                INFLUENZA VACCINE (#1) 2020                      

 

                PNEUMOCOCCAL 0-64 YEARS COMBINED Aged Out                       

 No longer eligible based on



                SERIES                                          patient's age to

 complete this



                                                                topic



documented as of this encounter



Results

Not on filedocumented in this encounter



Visit Diagnoses







                                        Diagnosis

 

                                        Suspected  Novel Coronavirus Infecti

on - Primary



documented in this encounter



Additional Health Concerns







                Infection       Onset Date      Last Indicated  Resolved Time

 

                COVID-19 Rule Out 2020      



documented as of this encounter

## 2020-07-26 NOTE — XMS REPORT
Continuity of Care Document

                            Created on:2020



Patient:GERMÁN THRASHER

Sex:Male

:1990

External Reference #:[id number]





Demographics







                          Address                   [address]



                                                    [place] [address]

 

                          Home Phone                [phone]

 

                          Work Phone                [phone]

 

                          Mobile Phone              [phone]

 

                          Email Address             [email or web address]

 

                          Preferred Language        English

 

                          Marital Status            Unknown

 

                          Sabianism Affiliation     000

 

                          Race                      Unknown

 

                          Additional Race(s)        Unavailable



                                                    White

 

                          Ethnic Group              Not  or 









Author







                          Organization              [facility]

t

 

                          Address                   [address]



                                                    [place] [address]

 

                          Phone                     [phone]









Support







                Name            Relationship    Address         Phone

 

                Shabbir         Mother          [address] [phone]



                                                [place] [address] 

 

                No              Unknown         Unavailable     [phone]









Care Team Providers







                    Name                Role                Phone

 

                    Peter SHIRLEY,  Dennys   Primary Care Physician [phone]

 

                    Nurse,  Pcp [facility] Attending Clinician Unavailabl

e









Problems







       Condition Condition Condition Status Onset  Resolution Last   Treating Co

mments 

Source



       Name   Details Category        Date   Date   Treatment Clinician        



                                                 Date                 

 

       Weakness Weakness Disease Active                              [facility]

on



                                   410                               Methodi



                                   00:00:                             st



                                   00                                 

 

       Chronic Chronic Disease Active                              [place]



       bilateral bilateral               -10                               Meth

oswaldo



       low back low back               00:00:                             st



       pain   pain                 00                                 



       without without                                                  



       sciatica sciatica                                                  

 

       Bipolar Bipolar Disease Active                                    [place]



       depression depression                                                  Me





                                                                      st







Allergies, Adverse Reactions, Alerts







       Allergy Allergy Status Severity Reaction(s) Onset  Inactive Treating Comm

ents 

Source



       Name   Type                        Date   Date   Clinician        

 

       Penicill Propensi Active        Swelling                       [facility]

ton



       ins    ty to                       4-10                        Methodi



              adverse                      00:00:                      st



              reaction                      00                          



              s to                                                    



              drug                                                    

 

       Sulfa  Propensi Active        Anaphylaxis                       Aw[facility]

ston



       (Sulfona ty to                       4-10                        Methodi



       mide   adverse                      00:00:                      st



       Antibiot reaction                      00                          



       ics)   s to                                                    



              drug                                                    







Family History







           Family Member Diagnosis  Comments   Start Date Stop Date  Source

 

           Natural brother No Known Problems                                  [facility]

 

           Natural father Hypertension                                  [place] 

[facility]

 

           Natural mother Fibromyalgia                                  [place] 

[facility]

 

           [facility]                                       [facility]







Social History







           Social Habit Start Date Stop Date  Quantity   Comments   Source

 

           Sex Assigned At                                             [facility]

ethodist



           Birth                                                  

 

           Alcohol intake 2017-04-10 2017-04-10 Current drinker            [facility]

on [facility]



                      00:00:00   00:00:00   of alcohol            



                                            (finding)             









                Smoking Status  Start Date      Stop Date       Source

 

                Never smoker                                    [facility]

t







Medications







       Ordered Filled Start  Stop   Current Ordering Indication Dosage Frequency

 Signature

                    Comments            Components          Source



     Medication Medication Date Date Medication? Clinician                (SIG) 

          



     Name Name                                                   

 

     lamoTRIgine      2017-0      Yes            25mg Q.5D Take 25 mg           

Gonzales



     (LaMICtal)      4-10                               by mouth 2           Met

hodi



     25 MG      15:15:                               (two)           st



     tablet      06                                 times a           



                                                  day.           

 

     venlafaxine      2017-0      Yes            50mg Q.5D Take 50 mg           

[place]



     (EFFEXOR)      4-10                               by mouth 2           Meth

oswaldo



     50 MG      15:15:                               (two)           st



     tablet      06                                 times a           



                                                  day.           







Procedures

This patient has no known procedures.



Plan of Care







             Planned Activity Planned Date Details      Comments     Source

 

             Future Scheduled 2020   INFLUENZA VACCINE              Velma Singh



             Test         00:00:00     [code = INFLUENZA              



                                       VACCINE]                  







Encounters







        Start   End     Encounter Admission Attending Care    Care    Encounter 

Source



        Date/Time Date/Time Type    Type    Clinicians Facility Department ID   

   

 

        2020 Laboratory         Nurse, [facility]    [phone] 

[id number] 



        14:40:23 14:55:23 Only            Pcp     PRIMARY [id number]         



                                        Assessment CARE    [id number]         



                                        Clinic  [place] [phone]         



                                                        042             







Results

This patient has no known results.

## 2020-07-27 VITALS — OXYGEN SATURATION: 99 %

## 2020-07-27 VITALS — TEMPERATURE: 98.3 F | DIASTOLIC BLOOD PRESSURE: 63 MMHG | SYSTOLIC BLOOD PRESSURE: 111 MMHG

## 2020-07-27 LAB
ALBUMIN SERPL BCP-MCNC: 4.1 G/DL (ref 3.4–5)
ALP SERPL-CCNC: 68 U/L (ref 45–117)
ALT SERPL W P-5'-P-CCNC: 38 U/L (ref 12–78)
AST SERPL W P-5'-P-CCNC: 24 U/L (ref 15–37)
BUN BLD-MCNC: 17 MG/DL (ref 7–18)
GLUCOSE SERPLBLD-MCNC: 112 MG/DL (ref 74–106)
MAGNESIUM SERPL-MCNC: 2.3 MG/DL (ref 1.8–2.4)
METHAMPHET UR QL SCN: NEGATIVE
NT-PROBNP SERPL-MCNC: 6 PG/ML (ref ?–125)
POTASSIUM SERPL-SCNC: 3.5 MMOL/L (ref 3.5–5.1)
THC SERPL-MCNC: NEGATIVE NG/ML
TROPONIN (EMERG DEPT USE ONLY): < 0.02 NG/ML (ref 0–0.04)

## 2020-07-27 NOTE — RAD REPORT
EXAM DESCRIPTION:  RAD - Chest Single View - 7/27/2020 12:17 am

 

CLINICAL HISTORY:  PALPITATIONS, tachycardia

 

COMPARISON:  None none

 

TECHNIQUE:  AP portable chest image was obtained 7/27/2020 12:17 am .

 

FINDINGS:  Lungs are clear. Heart and vasculature are normal. No measurable pleural effusion and no p
neumothorax. No acute bony abnormality seen. No acute aortic findings suspected.

 

IMPRESSION:  No acute cardiopulmonary process.

## 2020-07-27 NOTE — EDPHYS
Physician Documentation                                                                           

 [facility]                                                                 

Name: Diogo Shea Jr                                                                              

Age: 29 yrs                                                                                       

Sex: Male                                                                                         

: 1990                                                                                   

MRN: [id number]                                                                                   

Arrival Date: 2020                                                                          

Time: 22:12                                                                                       

Account#: [id number]                                                                            

Bed 20                                                                                            

Private MD:                                                                                       

ED Physician Ramon Nevarez                                                                     

HPI:                                                                                              

                                                                                             

02:37 This 29 yrs old  Male presents to ER via Ambulatory with complaints of         tw4 

      Irregular Pulse.                                                                            

02:37 The patient presents with a history of heart skipping beats. Context: The symptoms      tw4 

      occur without known cause. Onset: The symptoms/episode began/occurred today. Duration:      

      The patient or guardian reports multiple episodes, that wax and wane. Modifying             

      factors: The symptoms are aggravated by substance abuse cocaine, The symptoms are           

      alleviated by nothing. Associated signs and symptoms: Pertinent positives: SOB.             

      Severity of symptoms: At their worst the symptoms were mild in the emergency department     

      the symptoms have improved. The patient has not experienced similar symptoms in the         

      past.                                                                                       

                                                                                                  

Historical:                                                                                       

- Allergies:                                                                                      

                                                                                             

22:24 PENICILLINS;                                                                            lp1 

22:24 Sulfa (Sulfonamide Antibiotics);                                                        lp1 

- Home Meds:                                                                                      

22:24 None [Active];                                                                          lp1 

- PMHx:                                                                                           

22:24 None;                                                                                   lp1 

- PSHx:                                                                                           

22:24 None;                                                                                   lp1 

                                                                                                  

- Immunization history:: Adult Immunizations up to date.                                          

- Social history:: Smoking status: Patient reports the use of cigarette tobacco                   

  products, denies chronic smoking, but will smoke occasionally, Patient uses alcohol,            

  occasionally. street drugs, cocaine.                                                            

                                                                                                  

                                                                                                  

ROS:                                                                                              

                                                                                             

02:37 Constitutional: Negative for fever, chills, and weight loss, Eyes: Negative for injury, tw4 

      pain, redness, and discharge, Respiratory: Negative for shortness of breath, cough,         

      wheezing, and pleuritic chest pain, Abdomen/GI: Negative for abdominal pain, nausea,        

      vomiting, diarrhea, and constipation, Back: Negative for injury and pain, MS/Extremity:     

      Negative for injury and deformity, Skin: Negative for injury, rash, and discoloration,      

      Neuro: Negative for headache, weakness, numbness, tingling, and seizure.                    

      Cardiovascular: Positive for palpitations, Negative for chest pain, edema, orthopnea,       

      paroxysmal nocturnal dyspnea.                                                               

                                                                                                  

Exam:                                                                                             

02:37 Constitutional:  This is a well developed, well nourished patient who is awake, alert,  tw4 

      and in no acute distress. Head/Face:  Normocephalic, atraumatic. Chest/axilla:  Normal      

      chest wall appearance and motion.  Nontender with no deformity.  No lesions are             

      appreciated. Cardiovascular:  Regular rate and rhythm with a normal S1 and S2.  No          

      gallops, murmurs, or rubs.  Normal PMI, no JVD.  No pulse deficits. Respiratory:  Lungs     

      have equal breath sounds bilaterally, clear to auscultation and percussion.  No rales,      

      rhonchi or wheezes noted.  No increased work of breathing, no retractions or nasal          

      flaring. Abdomen/GI:  Soft, non-tender, with normal bowel sounds.  No distension or         

      tympany.  No guarding or rebound.  No evidence of tenderness throughout. Back:  No          

      spinal tenderness.  No costovertebral tenderness.  Full range of motion. MS/ Extremity:     

       Pulses equal, no cyanosis.  Neurovascular intact.  Full, normal range of motion.           

      Neuro:  Awake and alert, GCS 15, oriented to person, place, time, and situation.            

      Cranial nerves II-XII grossly intact.  Motor strength 5/5 in all extremities.  Sensory      

      grossly intact.  Cerebellar exam normal.  Normal gait.                                      

                                                                                                  

Vital Signs:                                                                                      

                                                                                             

22:22  / 88; Pulse 100; Resp 18; Temp 98.1(O); Pulse Ox 100% on R/A; Weight 102.06 kg   lp1 

      (R); Height 6 ft. 1 in. (185.42 cm); Pain 0/10;                                             

23:21  / 79; Pulse 76; Resp 18; Temp 98.1; Pulse Ox 98% on R/A; Pain 4/10;              ks7 

23:57  / 79; Pulse 64; Resp 18; Temp 98.1(TE); Pulse Ox 98% on R/A; Pain 0/10;          ks7 

                                                                                             

01:00  / 67; Pulse 64; Resp 18; Temp 98.2; Pulse Ox 99% ;                               ds4 

02:00  / 63; Pulse 66; Resp 18; Temp 98.3; Pulse Ox 99% ;                               ds4 

                                                                                             

22:22 Body Mass Index 29.68 (102.06 kg, 185.42 cm)                                            lp1 

                                                                                                  

MDM:                                                                                              

00:37 Patient medically screened.                                                             tw4 

02:39 Differential diagnosis: arrythmia, dehydration, stress disorder. Data reviewed: vital   tw4 

      signs, nurses notes. Data reviewed: lab test result(s), cardiac enzymes, CBC,               

      electrolytes, hepatic panel, EKG, radiologic studies, plain films. Data interpreted:        

      Pulse oximetry: Interpretation: normal. Test interpretation: by ED physician or             

      midlevel provider: ECG, plain radiologic studies. Counseling: I had a detailed              

      discussion with the patient and/or guardian regarding: the historical points, exam          

      findings, and any diagnostic results supporting the discharge/admit diagnosis, lab          

      results, radiology results. Special discussion: I discussed with the patient/guardian       

      in detail that at this point there is no indication for admission to the hospital. It       

      is understood, however, that if the symptoms persist or worsen the patient needs to         

      return immediately for re-evaluation.                                                       

                                                                                                  

                                                                                             

23:17 Order name: Basic Metabolic Panel; Complete Time: :                                                                                             

01:56 Interpretation: Normal except: GLUC 112; CRE 1.34; GFR 64.                                                                                                                           

23:17 Order name: CBC with Diff; Complete Time:                                                                                              

01:56 Interpretation: Normal except: EOSINOPHIL % 5.5.                                                                                                                                     

23:17 Order name: LFT's; Complete Time:                                                                                              

01:57 Interpretation: Within normal limits.                                                                                                                                                

23:17 Order name: Magnesium; Complete Time: :                                                                                             

01:57 Interpretation: Within normal limits: MG 2.3.                                                                                                                                        

23:17 Order name: NT PRO-BNP; Complete Time: :                                                                                             

01:57 Interpretation: Within normal limits: NT PRO-BNP 6.                                                                                                                                  

23:17 Order name: PT-INR; Complete Time: :                                                                                             

01:57 Interpretation: Within normal limits: PT 10.7.                                                                                                                                       

22:55 Order name: EKG - Nurse/Tech; Complete Time: 23:10                                      ar5 

                                                                                             

23:17 Order name: Troponin (emerg Dept Use Only); Complete Time: :                                                                                             

01:57 Interpretation: Within normal limits: TROPED < 0.02.                                                                                                                                 

23:17 Order name: XRAY Chest (1 view)                                                                                                                                                      

23:17 Order name: Cardiac monitoring; Complete Time: 23:22                                    tw4 

                                                                                             

23:18 Order name: Urine Drug Screen; Complete Time: 01:56                                                                                                                                  

01:56 Interpretation: Normal except: VIKY POSITIVE.                                                                                                                                         

23:24 Order name: Urine Dipstick--Ancillary (enter results)                                   4 

                                                                                             

23:17 Order name: IV Saline Lock; Complete Time: 23:56                                        4 

                                                                                             

23:17 Order name: Labs collected and sent; Complete Time: 23:56                               4 

                                                                                             

23:17 Order name: O2 Per Protocol; Complete Time: 23:23                                       4 

                                                                                             

23:17 Order name: O2 Sat Monitoring; Complete Time: 23:22                                      

                                                                                                  

EC:37 Rate is 81 beats/min. Rhythm is regular. QRS Axis is Normal. MA interval is normal. QRS tw4 

      interval is normal. QT interval is normal. No Q waves. T waves are Flattened in leads       

      III, V6. No ST changes noted. Clinical impression: NSR w/ Non-specific ST/T Changes.        

      Interpreted by me. Reviewed by me.                                                          

                                                                                                  

Administered Medications:                                                                         

No medications were administered                                                                  

                                                                                                  

                                                                                                  

Disposition:                                                                                      

20 02:14 Discharged to Home. Impression: Palpitations, Cocaine abuse.                       

- Condition is Stable.                                                                            

- Discharge Instructions: Palpitations, Substance Use Disorder.                                   

                                                                                                  

- Medication Reconciliation Form, Thank You Letter, Antibiotic Education, Prescription            

  Opioid Use form.                                                                                

- Follow up: Private Physician; When: Upon discharge from the Emergency Department;               

  Reason: Recheck today's complaints, Continuance of care, Re-evaluation by your                  

  physician.                                                                                      

- Problem is new.                                                                                 

- Symptoms have improved.                                                                         

                                                                                                  

                                                                                                  

                                                                                                  

Signatures:                                                                                       

Dispatcher [employer]                           EDEmily Chacon RN                         RN   lp1                                                  

Ramon Nevarez MD MD   tw4                                                  

Cecily Sandhu                               ar5                                                  

                                                                                                  

Corrections: (The following items were deleted from the chart)                                    

02:48 02:14 2020 02:14 Discharged to Home. Impression: Palpitations; Cocaine abuse.     lp1 

      Condition is Stable. Forms are Medication Reconciliation Form, Thank You Letter,            

      Antibiotic Education, Prescription Opioid Use. Follow up: Private Physician; When: Upon     

      discharge from the Emergency Department; Reason: Recheck today's complaints,                

      Continuance of care, Re-evaluation by your physician. Problem is new. Symptoms have         

      improved. tw4                                                                               

                                                                                                  

**************************************************************************************************

## 2020-07-27 NOTE — ER
Nurse's Notes                                                                                     

 [facility]                                                                 

Name: Diogo Shea Jr                                                                              

Age: 29 yrs                                                                                       

Sex: Male                                                                                         

: 1990                                                                                   

MRN: [id number]                                                                                   

Arrival Date: 2020                                                                          

Time: 22:12                                                                                       

Account#: [id number]                                                                            

Bed 20                                                                                            

Private MD:                                                                                       

Diagnosis: Palpitations;Cocaine abuse                                                             

                                                                                                  

Presentation:                                                                                     

                                                                                             

22:22 Chief complaint: Patient states: Patient states going to bed and felt like heart began  lp1 

      beating fast; States using cocaine and ETOH yesterday; Denies N/V. Coronavirus screen:      

      Patient denies a cough. Patient denies shortness of breath or difficulty breathing.         

      Patient denies measured and/or subjective temperature greater than 100.4F prior to          

      today's visit. Patient denies travel on a cruise ship or to a country the [facility] currently     

      lists as an affected area. Patient denies contact with known and/or suspected case of       

      COVID-19. Proceed with normal triage. Ebola Screen: No symptoms or risks identified at      

      this time. Initial Sepsis Screen: Does the patient meet any 2 criteria? No. Patient's       

      initial sepsis screen is negative. Does the patient have a suspected source of              

      infection? No. Patient's initial sepsis screen is negative. Risk Assessment: Do you         

      want to hurt yourself or someone else? Patient reports no desire to harm self or            

      others. Onset of symptoms was 2020 at 20:00.                                       

22:22 Method Of Arrival: Ambulatory                                                           lp1 

22:22 Acuity: BETSEY 3                                                                           lp1 

                                                                                                  

Triage Assessment:                                                                                

23:21 General: Appears in no apparent distress. pt used cocaine tonight, also having anxiety  ks7 

      d/t stress from having to leave his wife and son bc he joined the [employer]. in ED pt SR         

      70-80 but he states he feels like he having heart palpitations and has a cramping           

      feeling on the left lower part of his chest under his L nipple. aaox4 ambulatory..          

23:21 Pain: Complains of pain in chest, left lower Pain currently is 4 out of 10 on a pain    ks7 

      scale. Quality of pain is described as crampy, Pain began 1 hour ago. Is intermittent.      

      Cardiovascular: No deficits noted. Heart tones S1 S2 present Rhythm is regular.             

                                                                                             

02:00 General: Behavior is calm.                                                              lp1 

                                                                                                  

Historical:                                                                                       

- Allergies:                                                                                      

                                                                                             

22:24 PENICILLINS;                                                                            lp1 

22:24 Sulfa (Sulfonamide Antibiotics);                                                        lp1 

- Home Meds:                                                                                      

22:24 None [Active];                                                                          lp1 

- PMHx:                                                                                           

22:24 None;                                                                                   lp1 

- PSHx:                                                                                           

22:24 None;                                                                                   lp1 

                                                                                                  

- Immunization history:: Adult Immunizations up to date.                                          

- Social history:: Smoking status: Patient reports the use of cigarette tobacco                   

  products, denies chronic smoking, but will smoke occasionally, Patient uses alcohol,            

  occasionally. street drugs, cocaine.                                                            

                                                                                                  

                                                                                                  

Screenin:26 Abuse screen: Denies threats or abuse. Denies injuries from another. Nutritional        lp1 

      screening: No deficits noted. Tuberculosis screening: No symptoms or risk factors           

      identified. Fall Risk None identified.                                                      

                                                                                                  

Assessment:                                                                                       

23:25 Pain: Complains of pain in chest Pain does not radiate. Pain Quality of pain is         ks7 

      described as crampy.                                                                        

                                                                                             

00:09 Reassessment: Patient is alert, oriented x 3, equal unlabored respirations, skin        ks7 

      warm/dry/pink. Patient states feeling better. Pain: Is intermittent, lasting a few          

      seconds.                                                                                    

01:00 Reassessment: Patient appears in no apparent distress at this time. Patient and/or      lp1 

      family updated on plan of care and expected duration. Pain level reassessed. Patient is     

      alert, oriented x 3, equal unlabored respirations, skin warm/dry/pink.                      

02:46 Reassessment: Patient is alert, oriented x 3, equal unlabored respirations, skin        lp1 

      warm/dry/pink. Patient demonstrates understanding of discharge instructions Patient         

      denies pain at this time. Patient states feeling better. Patient states symptoms have       

      improved.                                                                                   

                                                                                                  

Vital Signs:                                                                                      

                                                                                             

22:22  / 88; Pulse 100; Resp 18; Temp 98.1(O); Pulse Ox 100% on R/A; Weight 102.06 kg   lp1 

      (R); Height 6 ft. 1 in. (185.42 cm); Pain 0/10;                                             

23:21  / 79; Pulse 76; Resp 18; Temp 98.1; Pulse Ox 98% on R/A; Pain 4/10;              ks7 

23:57  / 79; Pulse 64; Resp 18; Temp 98.1(TE); Pulse Ox 98% on R/A; Pain 0/10;          ks7 

                                                                                             

01:00  / 67; Pulse 64; Resp 18; Temp 98.2; Pulse Ox 99% ;                               ds4 

02:00  / 63; Pulse 66; Resp 18; Temp 98.3; Pulse Ox 99% ;                               ds4 

                                                                                             

22:22 Body Mass Index 29.68 (102.06 kg, 185.42 cm)                                            1 

                                                                                                  

ED Course:                                                                                        

                                                                                             

22:12 Patient arrived in ED.                                                                  ds1 

22:23 Triage completed.                                                                       lp1 

22:23 Arm band placed on.                                                                     lp1 

23:00 Ramon Nevarez MD is Attending Physician.                                            tw4 

23:01 Christa Frey, RN is Primary Nurse.                                                ks7 

23:22 Urine Drug Screen Sent.                                                                 ds4 

23:25 Resting quietly. ED physician to see patient.                                           ks7 

23:25 Patient has correct armband on for positive identification. Placed in gown. Bed in low  ks7 

      position. Call light in reach. Side rails up X2. Cardiac monitor on. Pulse ox on. NIBP      

      on.                                                                                         

23:25 No provider procedures requiring assistance completed. Patient maintains SpO2           ks7 

      saturation greater than 95% on room air.                                                    

23:47 Inserted saline lock: 20 gauge in right antecubital area, using aseptic technique.      4 

      Blood collected.                                                                            

23:56 Basic Metabolic Panel Sent.                                                             ks7 

23:56 CBC with Diff Sent.                                                                     ks7 

23:56 LFT's Sent.                                                                             ks7 

23:56 Magnesium Sent.                                                                         ks7 

23:56 NT PRO-BNP Sent.                                                                        ks7 

23:56 PT-INR Sent.                                                                            ks7 

23:56 XRAY Chest (1 view) Sent.                                                               ks7 

07                                                                                             

00:27 XRAY Chest (1 view) In Process Unspecified.                                             EDMS

02:47 IV discontinued, No redness/swelling at site. Pressure dressing applied.                lp1 

                                                                                                  

Administered Medications:                                                                         

No medications were administered                                                                  

                                                                                                  

                                                                                                  

Outcome:                                                                                          

02:14 Discharge ordered by MD.                                                                tw4 

02:47 Discharged to home ambulatory.                                                          lp1 

02:47 Condition: good                                                                             

02:47 Discharge instructions given to patient, Instructed on discharge instructions, follow       

      up and referral plans. Demonstrated understanding of instructions, follow-up care.          

02:48 Patient left the ED.                                                                    lp1 

                                                                                                  

Signatures:                                                                                       

Dispatcher [employer]                           EDMS                                                 

Kianna Coello                                1                                                  

Emily Manuel RN                         RN   1                                                  

Haim Graham                             4                                                  

Ramon Nevarez MD MD   [facility]                                                  

Carlos Davey                                 [facility]                                                  

Christa Frey, RN                  RN   ks7                                                  

                                                                                                  

Corrections: (The following items were deleted from the chart)                                    

00:11  23:57  / 79; Pulse 64bpm; Resp 18bpm; Pulse Ox 98% RA; Temp 98F Temporal;   ks7 

      Pain 0/10; ks7                                                                              

                                                                                                  

**************************************************************************************************

## 2020-08-03 ENCOUNTER — HOSPITAL ENCOUNTER (EMERGENCY)
Dept: HOSPITAL 97 - ER | Age: 30
Discharge: HOME | End: 2020-08-03
Payer: SELF-PAY

## 2020-08-03 VITALS — SYSTOLIC BLOOD PRESSURE: 121 MMHG | DIASTOLIC BLOOD PRESSURE: 70 MMHG

## 2020-08-03 VITALS — TEMPERATURE: 98.3 F | OXYGEN SATURATION: 100 %

## 2020-08-03 DIAGNOSIS — Z88.2: ICD-10-CM

## 2020-08-03 DIAGNOSIS — R11.10: Primary | ICD-10-CM

## 2020-08-03 DIAGNOSIS — Z88.0: ICD-10-CM

## 2020-08-03 LAB
ALBUMIN SERPL BCP-MCNC: 4.5 G/DL (ref 3.4–5)
ALP SERPL-CCNC: 53 U/L (ref 45–117)
ALT SERPL W P-5'-P-CCNC: 33 U/L (ref 12–78)
AST SERPL W P-5'-P-CCNC: 15 U/L (ref 15–37)
BUN BLD-MCNC: 10 MG/DL (ref 7–18)
GLUCOSE SERPLBLD-MCNC: 94 MG/DL (ref 74–106)
HCT VFR BLD CALC: 44.9 % (ref 39.6–49)
LYMPHOCYTES # SPEC AUTO: 1.2 K/UL (ref 0.7–4.9)
MAGNESIUM SERPL-MCNC: 2.4 MG/DL (ref 1.8–2.4)
PMV BLD: 9.4 FL (ref 7.6–11.3)
POTASSIUM SERPL-SCNC: 3.9 MMOL/L (ref 3.5–5.1)
RBC # BLD: 5.06 M/UL (ref 4.33–5.43)
TROPONIN (EMERG DEPT USE ONLY): < 0.02 NG/ML (ref 0–0.04)

## 2020-08-03 PROCEDURE — 36415 COLL VENOUS BLD VENIPUNCTURE: CPT

## 2020-08-03 PROCEDURE — 93005 ELECTROCARDIOGRAM TRACING: CPT

## 2020-08-03 PROCEDURE — 84484 ASSAY OF TROPONIN QUANT: CPT

## 2020-08-03 PROCEDURE — 80076 HEPATIC FUNCTION PANEL: CPT

## 2020-08-03 PROCEDURE — 71045 X-RAY EXAM CHEST 1 VIEW: CPT

## 2020-08-03 PROCEDURE — 80048 BASIC METABOLIC PNL TOTAL CA: CPT

## 2020-08-03 PROCEDURE — 85025 COMPLETE CBC W/AUTO DIFF WBC: CPT

## 2020-08-03 PROCEDURE — 85379 FIBRIN DEGRADATION QUANT: CPT

## 2020-08-03 PROCEDURE — 99285 EMERGENCY DEPT VISIT HI MDM: CPT

## 2020-08-03 PROCEDURE — 83735 ASSAY OF MAGNESIUM: CPT

## 2020-08-03 PROCEDURE — 81003 URINALYSIS AUTO W/O SCOPE: CPT
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
## 2020-08-03 NOTE — XMS REPORT
Continuity of Care Document

                            Created on:August 3, 2020



Patient:GERMÁN THRASHER

Sex:Male

:1990

External Reference #:[id number]





Demographics







                          Address                   [address]



                                                    [place] [address]

 

                          Home Phone                [phone]

 

                          Work Phone                [phone]

 

                          Mobile Phone              [phone]

 

                          Email Address             [email or web address]

 

                          Preferred Language        English

 

                          Marital Status            Unknown

 

                          Temple Affiliation     000

 

                          Race                      Unknown

 

                          Additional Race(s)        White



                                                    Unavailable

 

                          Ethnic Group              Not  or 









Author







                          Organization              [facility]

t

 

                          Address                   [address]



                                                    [place] [address]

 

                          Phone                     [phone]









Support







                Name            Relationship    Address         Phone

 

                Shabbir         Mother          [address] [phone]

[phone]



                                                [place] [address] 

 

                No              Unknown         Unavailable     [phone]









Care Team Providers







                    Name                Role                Phone

 

                    Peter SHIRLEY,  Dennys   Primary Care Physician [phone]

 

                    Nurse,  Pcp Assessment [facility] Attending Clinician Unavailabl

e









Problems







       Condition Condition Condition Status Onset  Resolution Last   Treating Co

mments 

Source



       Name   Details Category        Date   Date   Treatment Clinician        



                                                 Date                 

 

       Weakness Weakness Disease Active                              [facility]

on



                                   410                               Methodi



                                   00:00:                             st



                                   00                                 

 

       Chronic Chronic Disease Active                              Gl[place]win



       bilateral bilateral               -10                               Meth

oswaldo



       low back low back               00:00:                             st



       pain   pain                 00                                 



       without without                                                  



       sciatica sciatica                                                  

 

       Bipolar Bipolar Disease Active                                    Gl[place]win



       depression depression                                                  Me





                                                                      st







Allergies, Adverse Reactions, Alerts







       Allergy Allergy Status Severity Reaction(s) Onset  Inactive Treating Comm

ents 

Source



       Name   Type                        Date   Date   Clinician        

 

       Penicill Propensi Active        Swelling                       [facility]ous

ton



       ins    ty to                       4-10                        Methodi



              adverse                      00:00:                      st



              reaction                      00                          



              s to                                                    



              drug                                                    

 

       Sulfa  Propensi Active        Anaphylaxis                       Awilda

ston



       (Sulfona ty to                       4-10                        Methodi



       mide   adverse                      00:00:                      st



       Antibiot reaction                      00                          



       ics)   s to                                                    



              drug                                                    







Family History







           Family Member Diagnosis  Comments   Start Date Stop Date  Source

 

           Natural brother No Known Problems                                  [facility]

 

           Natural father Hypertension                                  [place] 

[facility]

 

           Natural mother Fibromyalgia                                  [place] 

[facility]

 

           [facility]







Social History







           Social Habit Start Date Stop Date  Quantity   Comments   Source

 

           Sex Assigned At                                             [facility]

ethodist



           Birth                                                  

 

           Alcohol intake 2017-04-10 2017-04-10 Current drinker            [place]

on [facility]



                      00:00:00   00:00:00   of alcohol            



                                            (finding)             









                Smoking Status  Start Date      Stop Date       Source

 

                Never smoker                                    [facility]

t







Medications







       Ordered Filled Start  Stop   Current Ordering Indication Dosage Frequency

 Signature

                    Comments            Components          Source



     Medication Medication Date Date Medication? Clinician                (SIG) 

          



     Name Name                                                   

 

     lamoTRIgine      2017-0      Yes            25mg Q.5D Take 25 mg           

[facility]



     (LaMICtal)      4-10                               by mouth 2           Met

hodi



     25 MG      15:15:                               (two)           st



     tablet      06                                 times a           



                                                  day.           

 

     venlafaxine      2017-0      Yes            50mg Q.5D Take 50 mg           

[facility]



     (EFFEXOR)      4-10                               by mouth 2           Meth

oswaldo



     50 MG      15:15:                               (two)           st



     tablet      06                                 times a           



                                                  day.           







Procedures

This patient has no known procedures.



Plan of Care







             Planned Activity Planned Date Details      Comments     Source

 

             Future Scheduled 2020   INFLUENZA VACCINE              Velma Singh



             Test         00:00:00     [code = INFLUENZA              



                                       VACCINE]                  







Encounters







        Start   End     Encounter Admission Attending Care    Care    Encounter 

Source



        Date/Time Date/Time Type    Type    Clinicians Facility Department ID   

   

 

        2020 Laboratory         Nurse, Sascha [facility]    [id number] 

[id number] 



        14:40:23 14:55:23 Only            Pcp     PRIMARY [id number]         



                                        Assessment CARE    [id number]         



                                        Clinic  [place] [id number]         



                                                        042             







Results

This patient has no known results.

## 2020-08-03 NOTE — RAD REPORT
EXAM DESCRIPTION:  Leda Single View8/3/2020 5:25 pm

 

CLINICAL HISTORY:  Chest pain

 

COMPARISON:  7/26/2020

 

FINDINGS:   The lungs appear clear of acute infiltrate. The heart is normal size

 

IMPRESSION:   No acute abnormalities displayed

## 2020-08-03 NOTE — ER
Nurse's Notes                                                                                     

 [facility]                                                                 

Name: Diogo Shea Jr                                                                              

Age: 29 yrs                                                                                       

Sex: Male                                                                                         

: 1990                                                                                   

MRN: [id number]                                                                                   

Arrival Date: 2020                                                                          

Time: 15:02                                                                                       

Account#: [id number]                                                                            

Bed 19                                                                                            

Private MD:                                                                                       

Diagnosis: Other chest pain-pleurtic, vomiting                                                    

                                                                                                  

Presentation:                                                                                     

                                                                                             

15:23 Chief complaint: Patient states: Sore chest since yesterday afternoon. Woke up today    ca1 

      with sharp chest pain and SOB. An hour PTA, dizziness, drowsy. Denies cough.                

      Coronavirus screen: Client denies travel out of the [place] in the last 14 days. At this       

      time, the client does not indicate any symptoms associated with coronavirus-19. Ebola       

      Screen: Patient negative for fever greater than or equal to 101.5 degrees Fahrenheit,       

      and additional compatible Ebola Virus Disease symptoms Patient denies exposure to           

      infectious person. Patient denies travel to an Ebola-affected area in the 21 days           

      before illness onset. No symptoms or risks identified at this time. Initial Sepsis          

      Screen: Does the patient meet any 2 criteria? No. Patient's initial sepsis screen is        

      negative. Does the patient have a suspected source of infection? No. Patient's initial      

      sepsis screen is negative. Risk Assessment: Do you want to hurt yourself or someone         

      else? Patient reports no desire to harm self or others. Onset of symptoms was 2020.                                                                                   

15:23 Method Of Arrival: Ambulatory                                                           ca1 

15:23 Acuity: BETSEY 3                                                                           ca1 

                                                                                                  

Historical:                                                                                       

- Allergies:                                                                                      

15:28 PENICILLINS;                                                                            ca1 

15:28 Sulfa (Sulfonamide Antibiotics);                                                        ca1 

- Home Meds:                                                                                      

15:28 None [Active];                                                                          ca1 

- PMHx:                                                                                           

15:28 None;                                                                                   ca1 

- PSHx:                                                                                           

15:28 None;                                                                                   ca1 

                                                                                                  

- Immunization history:: Adult Immunizations up to date.                                          

- Social history:: Smoking status: Patient denies any tobacco usage or history of.                

                                                                                                  

                                                                                                  

Screenin:45 Abuse screen: Denies threats or abuse. Denies injuries from another. Nutritional        jr10

      screening: No deficits noted. Tuberculosis screening: No symptoms or risk factors           

      identified. Fall Risk None identified.                                                      

                                                                                                  

Assessment:                                                                                       

17:45 Reassessment: Pt presents to the ED from home with c/o anterior chest wall pain that    jr10

      started yesterday. States that Saturday he was on a family trip and had too much            

      alcohol to drink and believes that he aspirated in his sleep. Reports chest pain that       

      started yesterday that is achy in nature and worse with movement and deep inspiration.      

      Denies any sob at present, appears in NAD. Pt denies any cardiac hx. General: Appears       

      in no apparent distress. Behavior is calm, cooperative, appropriate for age. Pain:          

      Complains of pain in chest Pain does not radiate. Pain currently is 6 out of 10 on a        

      pain scale. Quality of pain is described as aching, throbbing, Pain began 1 day ago. Is     

      continuous. Neuro: No deficits noted. Cardiovascular: Reports chest pain, Denies            

      lightheadedness, nausea, shortness of breath, vomiting, Capillary refill < 3 seconds        

      Pulses are all present. Edema is absent. Rhythm is regular. Respiratory: No deficits        

      noted. Reports pain with respiration Airway is patent Respiratory effort is even,           

      unlabored, Respiratory pattern is regular, symmetrical, Breath sounds are clear             

      bilaterally. the patient has mild shortness of breath. GI: No deficits noted. Abdomen       

      is round non-distended, Bowel sounds present X 4 quads. Patient currently denies            

      diarrhea, nausea, vomiting. : No deficits noted. EENT: No deficits noted. Derm: No        

      deficits noted. Musculoskeletal: No deficits noted.                                         

18:14 Reassessment: upon entering room to start IV fluids, pt states "is it ok if I don't     jr10

      have the fluids, I have really bad anxiety and I'm already thinking the worse and I         

      think the idea of the IV and the feeling I'm going to get I just don't think I'll be        

      able to handle it." Pt appears anxious at this time, states that he started feeling         

      "funny" after the initial saline lock admin. Reports hx of anxiety but denies taking        

      medications at present.                                                                     

                                                                                                  

Vital Signs:                                                                                      

15:23  / 83; Pulse 66; Resp 16 S; Temp 98.3(O); Pulse Ox 100% on R/A; Weight 104.33 kg  ca1 

      (R); Height 6 ft. 1 in. (185.42 cm) (R); Pain 3/10;                                         

18:52  / 70; Pulse 58; Resp 18; Pulse Ox 100% on R/A;                                   jr10

15:23 Body Mass Index 30.34 (104.33 kg, 185.42 cm)                                            ca1 

                                                                                                  

ED Course:                                                                                        

15:02 Patient arrived in ED.                                                                  as  

15:27 Triage completed.                                                                       ca1 

15:28 Arm band placed on right wrist.                                                         ca1 

15:43 EKG completed in triage. Results shown to MD.                                           ca1 

16:31 Tamela Ruth, RN is Primary Nurse.                                                   jr10

17:05 Markus Benitez MD is Attending Physician.                                             [facility] 

17:19 Inserted saline lock: 20 gauge in right antecubital area, using aseptic technique.      sv  

      Blood collected. Flushed right antecubital with 5 ml normal saline.                         

17:20 X-ray(s) taken.                                                                         sv  

17:25 XRAY Chest (1 view) In Process Unspecified.                                             EDMS

17:45 Patient has correct armband on for positive identification. Bed in low position. Call   jr10

      light in reach. Side rails up X2. Cardiac monitor on. Pulse ox on. NIBP on.                 

19:01 No provider procedures requiring assistance completed. IV discontinued, No              jr10

      redness/swelling at site. Pressure dressing applied. Patient maintains SpO2 saturation      

      greater than 95% on room air.                                                               

                                                                                                  

Administered Medications:                                                                         

17:52 Drug: Aspirin 162 mg Route: PO;                                                         jr10

18:53 Follow up: Response: No adverse reaction                                                jr10

17:59 Not Given (Patient Refused): NS 0.9% 1000 ml IV at 1 bolus Per protocol; 1000 mL bolus  jr10

18:51 Not Given (Patient Refused): Thiamine 100 mg IV at per protocol once                    jr10

                                                                                                  

                                                                                                  

Outcome:                                                                                          

18:28 Discharge ordered by MD.                                                                [facility] 

18:53 Discharged to home ambulatory.                                                          jr10

18:53 Condition: good                                                                             

18:53 Condition: good                                                                             

18:53 Discharge instructions given to patient, Instructed on discharge instructions, follow       

      up and referral plans. Demonstrated understanding of instructions, follow-up care,          

      medications, Prescriptions given X 2.                                                       

19:02 Patient left the ED.                                                                    jr10

                                                                                                  

Signatures:                                                                                       

Dispatcher [employer]                           EDMS                                                 

Jasmyne Banks, Markus Mohr RN, MD MD cha Martinez, Amelia as Acob, Cheryl RN                        RN   Tamela Galindo, SUE                     RN   jr10                                                 

                                                                                                  

**************************************************************************************************

## 2020-08-03 NOTE — XMS REPORT
Clinical Summary

                            Created on:August 3, 2020



Patient:Diogo Shea

Sex:Male

:1990

External Reference #:[id number]





Demographics







                          Address                   [address] [address]

 

                          Home Phone                [phone]

 

                          Email Address             [email or web address]

 

                          Preferred Language        English

 

                          Marital Status            Single

 

                          Quaker Affiliation     Unknown

 

                          Race                      Unknown

 

                          Ethnic Group              Not  or 









Author







                          Organization              [facility]

 

                          Address                   [address] [place] [id number]









Support







                Name            Relationship    Address         Phone

 

                Contact No      Unavailable     Unavailable     [phone]









Care Team Providers







                    Name                Role                Phone

 

                    Doris Green MD Primary Care Provider [phone]









Allergies







             Active Allergy Reactions    Severity     Noted Date   Comments

 

             Penicillins  Swelling                  04/10/2017   

 

             Sulfa (Sulfonamide Antibiotics) Anaphylaxis  High         04/10/201

7   







Medications







          Medication Sig       Dispensed Refills   Start Date End Date  Status

 

          lamoTRIgine (LaMICtal) Take 25 mg by           0                      

       Active



          25 MG tablet mouth 2 (two)                                         



                    times a day.                                         

 

          venlafaxine (EFFEXOR) Take 50 mg by           0                       

      Active



          50 MG tablet mouth 2 (two)                                         



                    times a day.                                         







Active Problems







                          Problem                   Noted Date

 

                          Weakness                  04/10/2017

 

                          Chronic bilateral low back pain without sciatica 04/10

/2017

 

                          Bipolar depression        







Family History







                Medical History Relation        Name            Comments

 

                No Known Problems Brother                         

 

                Hypertension    Father                          

 

                Fibromyalgia    Mother                          

 

                Other           Sister                          car accident









                Relation        Name            Status          Comments

 

                Brother                         Alive           

 

                Father                          Alive           

 

                Mother                          Alive           

 

                Sister                                  







Social History







             Tobacco Use  Types        Packs/Day    Years Used   Date

 

             Never Smoker                                        









                Smokeless Tobacco: Never Used                                 









                Alcohol Use     Drinks/Week     oz/Week         Comments

 

                Yes                                             









                          Sex Assigned at Birth     Date Recorded

 

                          Not on file               









                    Job Start Date      Occupation          Industry

 

                    Not on file         Not on file         Not on file









                    Travel History      Travel Start        Travel End









                                        No recent travel history available.







Last Filed Vital Signs

Not on file



Plan of Treatment







                Health Maintenance Due Date        Last Done       Comments

 

                INFLUENZA VACCINE 2020                      







Results

Not on fileafter 2019



Insurance







          Payer     Benefit Plan / Group Subscriber ID Effective Dates Phone    

 Address   Type

 

          [facility] xxxxxxxxx 2017-Present                 

    HMO/PPO



                    CHOICE/CHOICE +                                         









           Guarantor Name Account Type Relation to Date of    Phone      Billing

 Address



                                 Patient    Birth                 

 

           Diogo Shea Personal/Family Self       1990 [phone]

[place]



                                                       (Home)     [address]



                                                                  [id number]







Advance Directives

For more information, please contact: [phone]





                Type            Date Recorded   Patient Representative Explanati

on

 

                Advance Directives, Living Will and                             

    



                Medical Power of

## 2020-08-04 NOTE — EKG
Test Date:    2020-08-03               Test Time:    15:41:50

Technician:   CA                                     

                                                     

MEASUREMENT RESULTS:                                       

Intervals:                                           

Rate:         59                                     

ME:           154                                    

QRSD:         94                                     

QT:           378                                    

QTc:          374                                    

Axis:                                                

P:            26                                     

ME:           154                                    

QRS:          26                                     

T:            34                                     

                                                     

INTERPRETIVE STATEMENTS:                                       

                                                     

Sinus bradycardia

Otherwise normal ECG

Compared to ECG 07/26/2020 23:06:08

Sinus rhythm no longer present

T-wave abnormality no longer present



Electronically Signed On 08-04-20 11:01:50 CDT by Otis Quintero

## 2020-11-25 ENCOUNTER — HOSPITAL ENCOUNTER (EMERGENCY)
Dept: HOSPITAL 97 - ER | Age: 30
LOS: 1 days | Discharge: HOME | End: 2020-11-26
Payer: SELF-PAY

## 2020-11-25 DIAGNOSIS — Z88.2: ICD-10-CM

## 2020-11-25 DIAGNOSIS — F43.10: ICD-10-CM

## 2020-11-25 DIAGNOSIS — E87.6: ICD-10-CM

## 2020-11-25 DIAGNOSIS — K21.00: Primary | ICD-10-CM

## 2020-11-25 DIAGNOSIS — Z88.0: ICD-10-CM

## 2020-11-25 PROCEDURE — 85025 COMPLETE CBC W/AUTO DIFF WBC: CPT

## 2020-11-25 PROCEDURE — 80076 HEPATIC FUNCTION PANEL: CPT

## 2020-11-25 PROCEDURE — 36415 COLL VENOUS BLD VENIPUNCTURE: CPT

## 2020-11-25 PROCEDURE — 93005 ELECTROCARDIOGRAM TRACING: CPT

## 2020-11-25 PROCEDURE — 83880 ASSAY OF NATRIURETIC PEPTIDE: CPT

## 2020-11-25 PROCEDURE — 96374 THER/PROPH/DIAG INJ IV PUSH: CPT

## 2020-11-25 PROCEDURE — 71045 X-RAY EXAM CHEST 1 VIEW: CPT

## 2020-11-25 PROCEDURE — 83735 ASSAY OF MAGNESIUM: CPT

## 2020-11-25 PROCEDURE — 84484 ASSAY OF TROPONIN QUANT: CPT

## 2020-11-25 PROCEDURE — 99285 EMERGENCY DEPT VISIT HI MDM: CPT

## 2020-11-25 PROCEDURE — 80048 BASIC METABOLIC PNL TOTAL CA: CPT

## 2020-11-25 PROCEDURE — 85610 PROTHROMBIN TIME: CPT

## 2020-11-26 VITALS — DIASTOLIC BLOOD PRESSURE: 78 MMHG | SYSTOLIC BLOOD PRESSURE: 124 MMHG | TEMPERATURE: 98.2 F

## 2020-11-26 VITALS — OXYGEN SATURATION: 98 %

## 2020-11-26 LAB
ALBUMIN SERPL BCP-MCNC: 4.2 G/DL (ref 3.4–5)
ALP SERPL-CCNC: 65 U/L (ref 45–117)
ALT SERPL W P-5'-P-CCNC: 24 U/L (ref 12–78)
AST SERPL W P-5'-P-CCNC: 17 U/L (ref 15–37)
BUN BLD-MCNC: 10 MG/DL (ref 7–18)
GLUCOSE SERPLBLD-MCNC: 99 MG/DL (ref 74–106)
HCT VFR BLD CALC: 45.3 % (ref 39.6–49)
INR BLD: 0.87
LYMPHOCYTES # SPEC AUTO: 1.6 K/UL (ref 0.7–4.9)
MAGNESIUM SERPL-MCNC: 2.5 MG/DL (ref 1.8–2.4)
NT-PROBNP SERPL-MCNC: 5 PG/ML (ref ?–125)
PMV BLD: 9.1 FL (ref 7.6–11.3)
POTASSIUM SERPL-SCNC: 3.3 MMOL/L (ref 3.5–5.1)
RBC # BLD: 5.21 M/UL (ref 4.33–5.43)
TROPONIN (EMERG DEPT USE ONLY): < 0.02 NG/ML (ref 0–0.04)

## 2020-11-26 NOTE — ER
Nurse's Notes                                                                                     

 [facility]                                                                 

Name: Diogo Shea Jr                                                                              

Age: 29 yrs                                                                                       

Sex: Male                                                                                         

: 1990                                                                                   

MRN: [id number]                                                                                   

Arrival Date: 2020                                                                          

Time: 00:01                                                                                       

Account#: [id number]                                                                            

Bed 14                                                                                            

Private MD:                                                                                       

Diagnosis: Gastro-esophageal reflux disease with esophagitis;Hypokalemia                          

                                                                                                  

Presentation:                                                                                     

                                                                                             

00:14 Chief complaint: EMS states: patient complaining of epigastric pain that radiates to    fu  

      midsternal area, reports coffee ground emesis also. Patient was diagnosed last 2020 with gastric ulcer and esophagitis. patient tried to drive to the hospital but         

      experienced chest pain, dizziness and SOB on the way, thereby calling EMS. Coronavirus      

      screen: Client denies travel out of the [place] in the last 14 days. Ebola Screen: No          

      symptoms or risks identified at this time. Initial Sepsis Screen: Does the patient meet     

      any 2 criteria? No. Patient's initial sepsis screen is negative. Does the patient have      

      a suspected source of infection? No. Patient's initial sepsis screen is negative. Risk      

      Assessment: Do you want to hurt yourself or someone else? Patient reports no desire to      

      harm self or others. Onset of symptoms was 2020.                               

00:14 Method Of Arrival: EMS: [facility]                                                fu  

00:14 Acuity: BETSEY 3                                                                           fu  

                                                                                                  

Triage Assessment:                                                                                

00:35 General: Appears uncomfortable, Behavior is calm, cooperative, appropriate for age.     fu  

      Pain: Complains of pain in epigastric and midchest pain Pain currently is 4 out of 10       

      on a pain scale. Quality of pain is described as burning. Cardiovascular: Reports chest     

      pain, shortness of breath, Denies fatigue, nausea.                                          

                                                                                                  

Historical:                                                                                       

- Allergies:                                                                                      

00:26 PENICILLINS;                                                                            fu  

00:26 Sulfa (Sulfonamide Antibiotics);                                                        fu  

- Home Meds:                                                                                      

00:26 Atarax Oral [Active]; Effexor Oral [Active];                                            fu  

- PMHx:                                                                                           

00:41 PTSD, panic disorder depressive mode;                                                   fu  

- PSHx:                                                                                           

00:38 Ear Tubes; Tonsillectomy;                                                               fu  

                                                                                                  

- Immunization history:: Adult Immunizations not up to date.                                      

- Social history:: Smoking status: Patient/guardian denies using.                                 

                                                                                                  

                                                                                                  

Screenin:44 Abuse screen: Denies threats or abuse. Nutritional screening: No deficits noted.        fu  

      Tuberculosis screening: No symptoms or risk factors identified. Fall Risk None              

      identified.                                                                                 

                                                                                                  

Assessment:                                                                                       

00:42 General: Appears uncomfortable, Behavior is calm, cooperative, appropriate for age,     fu  

      Denies fever, fatigue, chills. Pain: Complains of pain in epigastric Pain radiates to       

      midchest Pain currently is 4 out of 10 on a pain scale. Pain began 3 hours ago. Neuro:      

      Level of Consciousness is awake, alert, obeys commands, Oriented to person, place,          

      time, situation,  are equal bilaterally Moves all extremities. Gait is steady,         

      Speech is normal, Facial symmetry appears normal. Cardiovascular: Reports chest pain,       

      Denies lightheadedness, syncope. Respiratory: Reports shortness of breath Airway is         

      patent Respiratory effort is even, unlabored, Respiratory pattern is regular.               

01:29 Reassessment: Patient and/or family updated on plan of care and expected duration. Pain fu  

      level reassessed. Patient is alert, oriented x 3, equal unlabored respirations, skin        

      warm/dry/pink. Patient stated he feels weird, complaining of left chest discomfort, Dr. Nevarez notified.                                                                            

                                                                                                  

Vital Signs:                                                                                      

00:14  / 89; Pulse 59; Resp 19; Temp 98.7(TE); Pulse Ox 98% on R/A; Weight 95.25 kg     fu  

      (R); Height 6 ft. 1 in. (185.42 cm); Pain 4/10;                                             

00:30  / 74; Pulse 65; Resp 16; Temp 98.3(TE); Pulse Ox 98% on R/A; Pain 5/10;          fu  

01:15  / 78; Pulse 65; Resp 19; Temp 98.2; Pulse Ox 98% on R/A;                         fu  

00:14 Body Mass Index 27.71 (95.25 kg, 185.42 cm)                                             fu  

                                                                                                  

ED Course:                                                                                        

00:01 Patient arrived in ED.                                                                  cl3 

00:04 Phong Harrison, RN is Primary Nurse.                                                    fu  

00:10 Ramon Nevarez MD is Attending Physician.                                            tw4 

00:25 Triage completed.                                                                       fu  

00:29 Initial lab(s) drawn, by me, sent to lab. Inserted saline lock: 20 gauge in right       rv  

      antecubital area, using aseptic technique. Blood collected.                                 

00:44 Patient has correct armband on for positive identification. Placed in gown. Bed in low  fu  

      position. Call light in reach. Side rails up X 1.                                           

00:44 Cardiac monitor on. Pulse ox on. NIBP on.                                               fu  

00:44 Patient provided with pillow and blanket.                                               fu  

00:45 No provider procedures requiring assistance completed.                                  fu  

00:45 Patient maintains SpO2 saturation greater than 95% on room air.                         fu  

00:47 XRAY Chest (1 view) In Process Unspecified.                                             EDMS

01:50 IV discontinued, bleeding controlled, Pressure dressing applied.                        fu  

                                                                                                  

Administered Medications:                                                                         

00:35 Drug: GI Cocktail without Donnatal - (Maalox Suspension 30 ml, Lidocaine Liquid 2 % 15  fu  

      ml) Route: PO;                                                                              

01:08 Follow up: Response: Pain is decreased                                                  fu  

00:52 Drug: ProTONIX 40 mg Route: IVP; Site: right antecubital;                               fu  

01:22 Follow up: Response: No adverse reaction                                                fu  

01:32 Drug: Potassium Effervescent Tablet 25 mEq Route: PO;                                   fu  

01:50 Follow up: Response: No adverse reaction                                                fu  

                                                                                                  

                                                                                                  

Outcome:                                                                                          

01:41 Discharge ordered by MD.                                                                tw4 

01:50 Discharged to home ambulatory.                                                          fu  

01:50 Condition: stable                                                                           

01:50 Discharge instructions given to patient, Instructed on discharge instructions, follow       

      up and referral plans. Demonstrated understanding of instructions, Prescriptions given      

      X 1.                                                                                        

01:53 Patient left the ED.                                                                    fu  

                                                                                                  

Signatures:                                                                                       

Dispatcher [employer]                           EDMS                                                 

Phong Harrison RN RN fu Wadley, Terrence, MD MD   tw4                                                  

Hari Maravilla RN RN rv Lewis, Charde                                cl3                                                  

                                                                                                  

Corrections: (The following items were deleted from the chart)                                    

01:02 00:14 Chief complaint: EMS states: patient complaining of epigastric pain that radiates fu  

      to midsternal area, reports coffee ground emesis also. Patient was diagnose last 2020 with gastric ulcer and esophagitis. patient tried to drive to the hospital but         

      experienced chest pain, dizzy and SOB on the way, thereby calling EMS. fu                   

01:15 00:14 Chief complaint: EMS states: patient complaining of epigastric pain that radiates fu  

      to midsternal area, reports coffee ground emesis also. Patient was diagnose last 2020 with gastric ulcer and esophagitis. patient tried to drive to the hospital but         

      experienced chest pain, dizziness and SOB on the way, thereby calling EMS. fu               

:25 00:41 Home Meds: Ativan 0.5 mg Oral tab; fu                                             fu  

01:49 01:15  / 78; Pulse 65bpm; Resp 19bpm; Pulse Ox 98% RA; fu                         fu  

01:52 00:57 pillow and blanket provided. fu                                                   fu  

                                                                                                  

**************************************************************************************************

## 2020-11-26 NOTE — RAD REPORT
EXAM DESCRIPTION:  Leda Single View11/26/2020 12:46 am

 

CLINICAL HISTORY:  Chest pain

 

COMPARISON:  August 2020

 

FINDINGS:   The lungs appear clear of acute infiltrate. The heart is normal size

 

IMPRESSION:   No acute abnormalities displayed

## 2020-11-26 NOTE — XMS REPORT
Clinical Summary

                          Created on:2020



Patient:Diogo Shea

Sex:Male

:1990

External Reference #:[id number]





Demographics







                          Address                   [address] [place] [address] [place] [address]

 

                          Home Phone                [phone]

 

                          Email Address             [email or web address]

 

                          Preferred Language        English

 

                          Marital Status            Single

 

                          Druze Affiliation     Unknown

 

                          Race                      Unknown

 

                          Ethnic Group              Not  or 









Author







                          Organization              [facility]

 

                          Address                   [address] [place] [id number]









Support







                Name            Relationship    Address         Phone

 

                Contact No      Unavailable     Unavailable     [phone]









Care Team Providers







                    Name                Role                Phone

 

                    Doris Green MD Primary Care Provider [phone]









Allergies







             Active Allergy Reactions    Severity     Noted Date   Comments

 

             Penicillins  Swelling                  04/10/2017   

 

             Sulfa (Sulfonamide Antibiotics) Anaphylaxis  High         04/10/201

7   







Medications







          Medication Sig       Dispensed Refills   Start Date End Date  Status

 

          lamoTRIgine (LaMICtal) Take 25 mg by           0                      

       Active



          25 MG tablet mouth 2 (two)                                         



                    times a day.                                         

 

          venlafaxine (EFFEXOR) Take 50 mg by           0                       

      Active



          50 MG tablet mouth 2 (two)                                         



                    times a day.                                         







Active Problems







                          Problem                   Noted Date

 

                          Weakness                  04/10/2017

 

                          Chronic bilateral low back pain without sciatica 04/10

/2017

 

                          Bipolar depression        







Surgical History







                Surgery         Date            Site/Laterality Comments

 

                TONSILLECTOMY AND ADENOIDECTOMY                                 







Medical History







                    Medical History     Date                Comments

 

                    Bipolar depression (HCC)                     

 

                    Weakness            4/10/2017           

 

                    Chronic bilateral low back pain without sciatica 4/10/2017  

         







Family History







                Medical History Relation        Name            Comments

 

                No Known Problems Brother                         

 

                Hypertension    Father                          

 

                Fibromyalgia    Mother                          

 

                Other           Sister                          car accident









                Relation        Name            Status          Comments

 

                Brother                         Alive           

 

                Father                          Alive           

 

                Mother                          Alive           

 

                Sister                                  







Social History







             Tobacco Use  Types        Packs/Day    Years Used   Date

 

             Never Smoker                                        









                Smokeless Tobacco: Never Used                                 









                Alcohol Use     Drinks/Week     oz/Week         Comments

 

                Yes                                             









                          Sex Assigned at Birth     Date Recorded

 

                          Not on file               







Last Filed Vital Signs

Not on file



Plan of Treatment







                Health Maintenance Due Date        Last Done       Comments

 

                INFLUENZA VACCINE 2020                      







Results

Not on fileafter 2019



Insurance







          Payer     Benefit Plan / Group Subscriber ID Effective Dates Phone    

 Address   Type

 

          [facility] [id number] 2017-Present                 

    HMO/PPO



                    CHOICE/CHOICE +                                         









           Guarantor Name Account Type Relation to Date of    Phone      Billing

 Address



                                 Patient    Birth                 

 

           Diogo Shea Personal/Family Self       1990 [phone] [address]



                                                       ([place])     [place], [place]



                                                                  [address]







Advance Directives

For more information, please contact: [phone]





                Type            Date Recorded   Patient Representative Explanati

on

 

                Advance Directives, Living Will and                             

    



                Medical Power of

## 2020-11-26 NOTE — XMS REPORT
Continuity of Care Document

                          Created on:2020



Patient:GERMÁN THRASHER

Sex:Male

:1990

External Reference #:[id number]





Demographics







                          Address                   [address]



                                                    [place] [address]

 

                          Home Phone                [phone]

 

                          Work Phone                [phone]

 

                          Mobile Phone              [phone]

 

                          Email Address             NONE

 

                          Preferred Language        English

 

                          Marital Status            Unknown

 

                          [employer] Affiliation     000

 

                          Race                      Unknown

 

                          Additional Race(s)        Unavailable



                                                    White

 

                          Ethnic Group              Not  or 









Author







                          Organization              [facility]

t

 

                          Address                   [address]



                                                    [place] [address]

 

                          Phone                     [phone]









Support







                Name            Relationship    Address         Phone

 

                Shabbir         Mother          [address] [phone]

[phone]



                                                [place] [address] 

 

                No              Unknown         Unavailable     [phone]









Care Team Providers







                    Name                Role                Phone

 

                    Peter SHIRLEY,  Dennys   Primary Care Physician [phone]

 

                    Nurse,  Pcp Assessment Clinic Attending Clinician Unavailabl

e









Problems







       Condition Condition Condition Status Onset  Resolution Last   Treating Co

mments 

Source



       Name   Details Category        Date   Date   Treatment Clinician        



                                                 Date                 

 

       Weakness Weakness Disease Active                              [place]

on



                                   4-10                               Methodi



                                   00:00:                             st



                                   00                                 

 

       Chronic Chronic Disease Active                              [place]



       bilateral bilateral               4-10                               Meth

oswaldo



       low back low back               00:00:                             st



       pain   pain                 00                                 



       without without                                                  



       sciatica sciatica                                                  

 

       Bipolar Bipolar Disease Active                                    [place]



       depression depression                                                  Me

thodi



                                                                      st







Allergies, Adverse Reactions, Alerts







       Allergy Allergy Status Severity Reaction(s) Onset  Inactive Treating Comm

ents 

Source



       Name   Type                        Date   Date   Clinician        

 

       Penicill Propensi Active        Swelling                       [facility]

ton



       ins    ty to                       4-10                        Methodi



              adverse                      00:00:                      st



              reaction                      00                          



              s to                                                    



              drug                                                    

 

       Sulfa  Propensi Active        Anaphylaxis                       [facility]

ston



       (Sulfona ty to                       4-10                        Methodi



       mide   adverse                      00:00:                      st



       Antibiot reaction                      00                          



       ics)   s to                                                    



              drug                                                    







Family History







           Family Member Diagnosis  Comments   Start Date Stop Date  Source

 

           Natural brother No Known Problems                                  [facility]

 

           Natural father Hypertension                                  [place] 

[facility]

 

           Natural mother Fibromyalgia                                  [place] 

[facility]

 

           [facility]                                       [facility]







Social History







           Social Habit Start Date Stop Date  Quantity   Comments   Source

 

           Sex Assigned At                                             [facility]

ethodist



           Birth                                                  

 

           Tobacco use and 2017-04-10 2017-04-10 Never used            [facility]

ethodist



           exposure   00:00:00   00:00:00                         

 

           Alcohol intake 2017-04-10 2017-04-10 Current drinker            [facility]

on [facility]



                      00:00:00   00:00:00   of alcohol            



                                            (finding)             









                Smoking Status  Start Date      Stop Date       Source

 

                Never smoker                                    [facility]

manasa







Medications







       Ordered Filled Start  Stop   Current Ordering Indication Dosage Frequency

 Signature

                    Comments            Components          Source



     Medication Medication Date Date Medication? Clinician                (SIG) 

          



     Name Name                                                   

 

     lamoTRIgine      2017-      Yes            25mg Q.5D Take 25 mg           

[facility]



     (LaMICtal)      4-10                               by mouth 2           Met

hodi



     25 MG      15:15:                               (two)           st



     tablet      06                                 times a           



                                                  day.           

 

     venlafaxine            Yes            50mg Q.5D Take 50 mg           

[facility]



     (EFFEXOR)      4-10                               by mouth 2           Meth

oswaldo



     50 MG      15:15:                               (two)           st



     tablet      06                                 times a           



                                                  day.           







Procedures

This patient has no known procedures.



Plan of Care







             Planned Activity Planned Date Details      Comments     Source

 

             Future Scheduled 2020   INFLUENZA VACCINE              [facility]

n [facility]



             Test         00:00:00     [code = INFLUENZA              



                                       VACCINE]                  







Encounters







        Start   End     Encounter Admission Attending Care    Care    Encounter 

Source



        Date/Time Date/Time Type    Type    Clinicians Facility Department ID   

   

 

        2020 Laboratory         Nurse, Sascha RUST    [id number] 

[id number] 



        14:40:23 14:55:23 Only            Pcp     PRIMARY [id number]         



                                        Assessment CARE    [id number]         



                                        Clinic  [place] [phone]         



                                                        042             







Results

This patient has no known results.

## 2020-11-26 NOTE — EDPHYS
Physician Documentation                                                                           

 [facility]                                                                 

Name: Diogo Shea Jr                                                                              

Age: 29 yrs                                                                                       

Sex: Male                                                                                         

: 1990                                                                                   

MRN: [id number]                                                                                   

Arrival Date: 2020                                                                          

Time: 00:01                                                                                       

Account#: [id number]                                                                            

Bed 14                                                                                            

Private MD:                                                                                       

ED Physician Ramon Nevarez                                                                     

HPI:                                                                                              

                                                                                             

00:45 This 29 yrs old  Male presents to ER via EMS with complaints of Chest Pain.    tw4 

00:45 The patient or guardian reports chest pain that is located primarily in the substernal  tw4 

      area. The pain does not radiate. Associated signs and symptoms: The patient has no          

      apparent associated signs or symptoms. Duration: The patient or guardian reports a          

      single episode. Modifying factors: The symptoms are alleviated by nothing. the symptoms     

      are aggravated by nothing. The patient has not experienced similar symptoms in the past.    

                                                                                                  

Historical:                                                                                       

- Allergies:                                                                                      

00:26 PENICILLINS;                                                                            fu  

00:26 Sulfa (Sulfonamide Antibiotics);                                                        fu  

- Home Meds:                                                                                      

00:26 Atarax Oral [Active]; Effexor Oral [Active];                                            fu  

- PMHx:                                                                                           

00:41 PTSD, panic disorder depressive mode;                                                   fu  

- PSHx:                                                                                           

00:38 Ear Tubes; Tonsillectomy;                                                               fu  

                                                                                                  

- Immunization history:: Adult Immunizations not up to date.                                      

- Social history:: Smoking status: Patient/guardian denies using.                                 

                                                                                                  

                                                                                                  

ROS:                                                                                              

00:44 Constitutional: Negative for fever, chills, and weight loss, Eyes: Negative for injury, tw4 

      pain, redness, and discharge, Respiratory: Negative for shortness of breath, cough,         

      wheezing, and pleuritic chest pain, Back: Negative for injury and pain, MS/Extremity:       

      Negative for injury and deformity, Skin: Negative for injury, rash, and discoloration,      

      Neuro: Negative for headache, weakness, numbness, tingling, and seizure.                    

00:44 Cardiovascular: Positive for chest pain, Negative for edema, orthopnea, palpitations.       

00:44 Abdomen/GI: Positive for abdominal pain, nausea, Negative for nausea and vomiting,          

      nausea, vomiting, and diarrhea, vomiting, diarrhea, constipation, abdominal cramps,         

      abdominal distension, anorexia, dysphagia, hematemesis, black/tarry stool, rectal pain.     

                                                                                                  

Exam:                                                                                             

00:43 Constitutional:  This is a well developed, well nourished patient who is awake, alert,  tw4 

      and in no acute distress. Head/Face:  Normocephalic, atraumatic. Chest/axilla:  Normal      

      chest wall appearance and motion.  Nontender with no deformity.  No lesions are             

      appreciated. Cardiovascular:  Regular rate and rhythm with a normal S1 and S2.  No          

      gallops, murmurs, or rubs.  Normal PMI, no JVD.  No pulse deficits. Respiratory:  Lungs     

      have equal breath sounds bilaterally, clear to auscultation and percussion.  No rales,      

      rhonchi or wheezes noted.  No increased work of breathing, no retractions or nasal          

      flaring. Abdomen/GI:  Soft, non-tender, with normal bowel sounds.  No distension or         

      tympany.  No guarding or rebound.  No evidence of tenderness throughout. Back:  No          

      spinal tenderness.  No costovertebral tenderness.  Full range of motion. MS/ Extremity:     

       Pulses equal, no cyanosis.  Neurovascular intact.  Full, normal range of motion.           

      Neuro:  Awake and alert, GCS 15, oriented to person, place, time, and situation.            

      Cranial nerves II-XII grossly intact.  Motor strength 5/5 in all extremities.  Sensory      

      grossly intact.  Cerebellar exam normal.  Normal gait.                                      

                                                                                                  

Vital Signs:                                                                                      

00:14  / 89; Pulse 59; Resp 19; Temp 98.7(TE); Pulse Ox 98% on R/A; Weight 95.25 kg     fu  

      (R); Height 6 ft. 1 in. (185.42 cm); Pain 4/10;                                             

00:30  / 74; Pulse 65; Resp 16; Temp 98.3(TE); Pulse Ox 98% on R/A; Pain 5/10;          fu  

01:15  / 78; Pulse 65; Resp 19; Temp 98.2; Pulse Ox 98% on R/A;                         fu  

00:14 Body Mass Index 27.71 (95.25 kg, 185.42 cm)                                             fu  

                                                                                                  

MDM:                                                                                              

00:10 Patient medically screened.                                                             tw4 

01:47 Differential diagnosis: gastroesophageal reflux disease (GERD), pulmonary embolus,      tw4 

      unstable angina. Data reviewed: vital signs, nurses notes. Data interpreted: Pulse          

      oximetry:. Counseling: I had a detailed discussion with the patient and/or guardian         

      regarding: the historical points, exam findings, and any diagnostic results supporting      

      the discharge/admit diagnosis. Special discussion: I discussed with the                     

      patient/guardian in detail that at this point there is no indication for admission to       

      the hospital. It is understood, however, that if the symptoms persist or worsen the         

      patient needs to return immediately for re-evaluation.                                      

                                                                                                  

                                                                                             

00:13 Order name: Basic Metabolic Panel; Complete Time: 01:17                                                                                                                              

01:17 Interpretation: Normal except: K 3.3; GFR 76.                                                                                                                                        

00:14 Order name: CBC with Diff; Complete Time: :                                                                                             

01:18 Interpretation: Within normal limits.                                                                                                                                                

00:14 Order name: LFT's; Complete Time: :                                                                                             

01:18 Interpretation: Within normal limits.                                                                                                                                                

00:14 Order name: Magnesium; Complete Time: :                                                                                             

01:17 Interpretation: Normal except: MG 2.5.                                                                                                                                               

00:14 Order name: NT PRO-BNP; Complete Time: :                                                                                             

01:18 Interpretation: Within normal limits: NT PRO-BNP 5.                                                                                                                                  

00:14 Order name: PT-INR; Complete Time: 01:17                                                                                                                                             

01:19 Interpretation: Within normal limits: PT 10.3.                                                                                                                                       

00:14 Order name: Troponin (emerg Dept Use Only); Complete Time: :                                                                                             

01:19 Interpretation: Within normal limits: TROPED < 0.02.                                                                                                                                 

00:14 Order name: XRAY Chest (1 view)                                                                                                                                                      

00:14 Order name: EKG; Complete Time: 00:15                                                                                                                                                

00:14 Order name: Cardiac monitoring; Complete Time: 00:27                                                                                                                                 

00:14 Order name: EKG - Nurse/Tech; Complete Time: 00:27                                                                                                                                   

00:14 Order name: IV Saline Lock; Complete Time: 00:30                                                                                                                                     

00:14 Order name: Labs collected and sent; Complete Time: 00:30                                                                                                                            

00:14 Order name: O2 Per Protocol; Complete Time: 00:27                                                                                                                                    

00:14 Order name: O2 Sat Monitoring; Complete Time: 00: 

                                                                                                  

EC:51 Rate is 63 beats/min. Rhythm is regular. QRS Axis is Normal. IA interval is normal. QRS tw4 

      interval is normal. QT interval is normal. No Q waves. T waves are Normal. Clinical         

      impression: Normal ECG. Interpreted by me. Reviewed by me.                                  

                                                                                                  

Administered Medications:                                                                         

00:35 Drug: GI Cocktail without Donnatal - (Maalox Suspension 30 ml, Lidocaine Liquid 2 % 15  fu  

      ml) Route: PO;                                                                              

01:08 Follow up: Response: Pain is decreased                                                  fu  

00:52 Drug: ProTONIX 40 mg Route: IVP; Site: right antecubital;                               fu  

01:22 Follow up: Response: No adverse reaction                                                fu  

01:32 Drug: Potassium Effervescent Tablet 25 mEq Route: PO;                                   fu  

01:50 Follow up: Response: No adverse reaction                                                fu  

                                                                                                  

                                                                                                  

Disposition:                                                                                      

20 01:41 Discharged to Home. Impression: Gastro-esophageal reflux disease with              

  esophagitis, Hypokalemia.                                                                       

- Condition is Stable.                                                                            

- Discharge Instructions: Esophagitis, Heartburn, Hypokalemia.                                    

- Prescriptions for Nexium 20 mg Oral Capsule - take 1 capsule by ORAL route once                 

  daily; 20 capsule.                                                                              

- Medication Reconciliation Form, Thank You Letter, Antibiotic Education, Prescription            

  Opioid Use form.                                                                                

- Follow up: Private Physician; When: Upon discharge from the Emergency Department;               

  Reason: Recheck today's complaints, Continuance of care, Re-evaluation by your                  

  physician.                                                                                      

- Problem is new.                                                                                 

- Symptoms have improved.                                                                         

                                                                                                  

                                                                                                  

                                                                                                  

Signatures:                                                                                       

Dispatcher [employer]                           EDMS                                                 

Phong Harrison RN RN fu Wadley, Terrence, MD MD   tw4                                                  

                                                                                                  

Corrections: (The following items were deleted from the chart)                                    

01:25 00:41 Home Meds: Ativan 0.5 mg Oral tab; fu                                             fu  

01:53 01:41 2020 01:41 Discharged to Home. Impression: Gastro-esophageal reflux disease fu  

      with esophagitis; Hypokalemia. Condition is Stable. Forms are Medication Reconciliation     

      Form, Thank You Letter, Antibiotic Education, Prescription Opioid Use. Follow up:           

      Private Physician; When: Upon discharge from the Emergency Department; Reason: Recheck      

      today's complaints, Continuance of care, Re-evaluation by your physician. Problem is        

      new. Symptoms have improved. tw4                                                            

                                                                                                  

**************************************************************************************************

## 2021-04-10 ENCOUNTER — HOSPITAL ENCOUNTER (EMERGENCY)
Dept: HOSPITAL 97 - ER | Age: 31
Discharge: HOME | End: 2021-04-10
Payer: SELF-PAY

## 2021-04-10 VITALS — DIASTOLIC BLOOD PRESSURE: 64 MMHG | OXYGEN SATURATION: 95 % | SYSTOLIC BLOOD PRESSURE: 116 MMHG

## 2021-04-10 VITALS — TEMPERATURE: 97.9 F

## 2021-04-10 DIAGNOSIS — R10.9: Primary | ICD-10-CM

## 2021-04-10 DIAGNOSIS — F41.0: ICD-10-CM

## 2021-04-10 DIAGNOSIS — F43.10: ICD-10-CM

## 2021-04-10 DIAGNOSIS — G47.00: ICD-10-CM

## 2021-04-10 DIAGNOSIS — M79.18: ICD-10-CM

## 2021-04-10 DIAGNOSIS — F32.9: ICD-10-CM

## 2021-04-10 DIAGNOSIS — R10.32: ICD-10-CM

## 2021-04-10 LAB
ALBUMIN SERPL BCP-MCNC: 4.1 G/DL (ref 3.4–5)
ALP SERPL-CCNC: 55 U/L (ref 45–117)
ALT SERPL W P-5'-P-CCNC: 36 U/L (ref 12–78)
AST SERPL W P-5'-P-CCNC: 21 U/L (ref 15–37)
BUN BLD-MCNC: 17 MG/DL (ref 7–18)
GLUCOSE SERPLBLD-MCNC: 101 MG/DL (ref 74–106)
HCT VFR BLD CALC: 44 % (ref 39.6–49)
LIPASE SERPL-CCNC: 75 U/L (ref 73–393)
LYMPHOCYTES # SPEC AUTO: 2 K/UL (ref 0.7–4.9)
PMV BLD: 9 FL (ref 7.6–11.3)
POTASSIUM SERPL-SCNC: 3.8 MMOL/L (ref 3.5–5.1)
RBC # BLD: 4.87 M/UL (ref 4.33–5.43)

## 2021-04-10 PROCEDURE — 96374 THER/PROPH/DIAG INJ IV PUSH: CPT

## 2021-04-10 PROCEDURE — 96375 TX/PRO/DX INJ NEW DRUG ADDON: CPT

## 2021-04-10 PROCEDURE — 83690 ASSAY OF LIPASE: CPT

## 2021-04-10 PROCEDURE — 80048 BASIC METABOLIC PNL TOTAL CA: CPT

## 2021-04-10 PROCEDURE — 76377 3D RENDER W/INTRP POSTPROCES: CPT

## 2021-04-10 PROCEDURE — 80076 HEPATIC FUNCTION PANEL: CPT

## 2021-04-10 PROCEDURE — 85025 COMPLETE CBC W/AUTO DIFF WBC: CPT

## 2021-04-10 PROCEDURE — 96361 HYDRATE IV INFUSION ADD-ON: CPT

## 2021-04-10 PROCEDURE — 74176 CT ABD & PELVIS W/O CONTRAST: CPT

## 2021-04-10 PROCEDURE — 99284 EMERGENCY DEPT VISIT MOD MDM: CPT

## 2021-04-10 PROCEDURE — 36415 COLL VENOUS BLD VENIPUNCTURE: CPT

## 2021-04-10 NOTE — EDPHYS
Physician Documentation                                                                           

 [facility]                                                                 

Name: Diogo Shea Jr                                                                              

Age: 30 yrs                                                                                       

Sex: Male                                                                                         

: 1990                                                                                   

MRN: [id number]                                                                                   

Arrival Date: 04/10/2021                                                                          

Time: 03:36                                                                                       

Account#: [id number]                                                                            

Bed 18                                                                                            

Private MD:                                                                                       

ED Physician Ever Gerardo                                                                      

HPI:                                                                                              

04/10                                                                                             

04:13 This 30 yrs old  Male presents to ER via Ambulatory with complaints of         mh7 

      Abdominal Pain.                                                                             

04:13 The patient complains of pain in the left flank. The pain does not radiate. Onset: The  mh7 

      symptoms/episode began/occurred 2 day(s) ago. Modifying factors: The symptoms are           

      alleviated by nothing. the symptoms are aggravated by nothing. Associated signs and         

      symptoms: Pertinent negatives: diarrhea, dizziness, dysuria, fever, urinary frequency,      

      headache, hematuria, nausea, pain radiating to the lower extremities, vomiting.             

      Severity of pain: At its worst the pain was moderate yesterday, in the emergency            

      department the pain is unchanged.                                                           

                                                                                                  

Historical:                                                                                       

- Allergies:                                                                                      

03:51 PENICILLINS;                                                                            bb  

03:51 Sulfa (Sulfonamide Antibiotics);                                                        bb  

- Home Meds:                                                                                      

03:51 Effexor Oral [Active]; Trazodone Oral [Active];                                         bb  

- PMHx:                                                                                           

03:51 PTSD, panic disorder depressive mode; Anxiety; insommnia;                               bb  

- PSHx:                                                                                           

03:51 Ear Tubes; Tonsillectomy;                                                               bb  

                                                                                                  

- Immunization history:: Adult Immunizations up to date.                                          

- Social history:: Smoking status: Patient denies any tobacco usage or history of.                

  Patient uses alcohol, occasionally. Patient/guardian denies using street drugs.                 

                                                                                                  

                                                                                                  

ROS:                                                                                              

04:13 Constitutional: Negative for fever, chills, and weight loss, Eyes: Negative for injury, mh7 

      pain, redness, and discharge, ENT: Negative for injury, pain, and discharge, Neck:          

      Negative for injury, pain, and swelling, Cardiovascular: Negative for chest pain,           

      palpitations, and edema, Respiratory: Negative for shortness of breath, cough,              

      wheezing, and pleuritic chest pain, : Negative for injury, bleeding, discharge, and       

      swelling, MS/Extremity: Negative for injury and deformity, Skin: Negative for injury,       

      rash, and discoloration, Neuro: Negative for headache, weakness, numbness, tingling,        

      and seizure, Psych: Negative for depression, anxiety, suicide ideation, homicidal           

      ideation, and hallucinations, Allergy/Immunology: Negative for hives, rash, and             

      allergies, Endocrine: Negative for neck swelling, polydipsia, polyuria, polyphagia, and     

      marked weight changes, Hematologic/Lymphatic: Negative for swollen nodes, abnormal          

      bleeding, and unusual bruising.                                                             

                                                                                                  

Exam:                                                                                             

04:13 Constitutional:  This is a well developed, well nourished patient who is awake, alert,  mh7 

      and in no acute distress. Head/Face:  Normocephalic, atraumatic. Eyes:  Pupils equal        

      round and reactive to light, extra-ocular motions intact.  Lids and lashes normal.          

      Conjunctiva and sclera are non-icteric and not injected.  Cornea within normal limits.      

      Periorbital areas with no swelling, redness, or edema. Neck:  Trachea midline, no           

      thyromegaly or masses palpated, and no cervical lymphadenopathy.  Supple, full range of     

      motion without nuchal rigidity, or vertebral point tenderness.  No Meningismus.             

      Chest/axilla:  Normal chest wall appearance and motion.  Nontender with no deformity.       

      No lesions are appreciated. Cardiovascular:  Regular rate and rhythm with a normal S1       

      and S2.  No gallops, murmurs, or rubs.  Normal PMI, no JVD.  No pulse deficits.             

      Respiratory:  Lungs have equal breath sounds bilaterally, clear to auscultation and         

      percussion.  No rales, rhonchi or wheezes noted.  No increased work of breathing, no        

      retractions or nasal flaring.                                                               

04:13 Skin:  Warm, dry with normal turgor.  Normal color with no rashes, no lesions, and no       

      evidence of cellulitis. MS/ Extremity:  Pulses equal, no cyanosis.  Neurovascular           

      intact.  Full, normal range of motion. Neuro:  Awake and alert, GCS 15, oriented to         

      person, place, time, and situation.  Cranial nerves II-XII grossly intact.  Motor           

      strength 5/5 in all extremities.  Sensory grossly intact.  Cerebellar exam normal.          

      Normal gait. Psych:  Awake, alert, with orientation to person, place and time.              

      Behavior, mood, and affect are within normal limits.                                        

04:13 Abdomen/GI: Inspection: abdomen appears normal, Bowel sounds: normal, in all quadrants,     

      Palpation: moderate abdominal tenderness, in the left lower quadrant, mass, is not          

      appreciated, rebound tenderness, is not appreciated, voluntary guarding, is not             

      appreciated, involuntary guarding, is not appreciated, no appreciated organomegaly,         

      Rectal exam: the exam is deferred, because of patient request, Indicators: McBurney's       

      point is not tender, Campoverde's sign is negative, Rovsing's sign is negative, Obturator       

      sign is negative, Psoas sign is negative, Liver: no appreciated palpable abnormalities,     

      Hernia: not appreciated.                                                                    

04:13 Back: normal spinal alignment noted, CVA tenderness, that is moderate, is noted on the      

      left, muscle spasm, is not present.                                                         

                                                                                                  

Vital Signs:                                                                                      

03:48  / 91; Pulse 72; Resp 20 S; Temp 97.9(O); Pulse Ox 98% on R/A; Weight 104.33 kg   bb  

      (R); Height 6 ft. 1 in. (185.42 cm) (R); Pain 10/10;                                        

04:00  / 79; Pulse 72; Resp 16; Pulse Ox 98% ;                                          sf  

04:34  / 74; Pulse 70; Resp 16; Pulse Ox 96% ;                                          sf  

05:00  / 70; Pulse 59; Resp 16; Pulse Ox 96% ;                                          sf  

05:15 Pain 5/10;                                                                              sf  

05:31  / 60; Pulse 67; Resp 16; Pulse Ox 96% ;                                          sf  

06:00  / 64; Pulse 60; Resp 16; Pulse Ox 95% ;                                          sf  

03:48 Body Mass Index 30.34 (104.33 kg, 185.42 cm)                                            bb  

                                                                                                  

MDM:                                                                                              

06:19 Differential diagnosis: nephrolithiasis, pyelonephritis, UTI, diverticulitis. Data      [facility] 

      reviewed: vital signs, nurses notes, lab test result(s), CBC, electrolytes, urinalysis,     

      radiologic studies, CT scan. Data interpreted: Pulse oximetry: on room air is 96 %.         

      Interpretation: normal. Counseling: I had a detailed discussion with the patient and/or     

      guardian regarding: the historical points, exam findings, and any diagnostic results        

      supporting the discharge/admit diagnosis, lab results, radiology results, the need for      

      outpatient follow up, to return to the emergency department if symptoms worsen or           

      persist or if there are any questions or concerns that arise at home. Response to           

      treatment: the patient's symptoms have markedly improved after treatment.                   

06:19 Differential diagnosis: Musculoskeletal Pain. ED course: Patient states that he now     mh7 

      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          

      starting 2 days ago..                                                                       

06:22 Patient medically screened.                                                             [facility] 

                                                                                                  

04/10                                                                                             

04:07 Order name: Basic Metabolic Panel; Complete Time: 05:07                                 [facility] 

04/10                                                                                             

04:07 Order name: CBC with Diff; Complete Time: 05:07                                         [facility] 

04/10                                                                                             

04:07 Order name: Hepatic Function; Complete Time: 05:07                                      [facility] 

04/10                                                                                             

04:07 Order name: Lipase; Complete Time: 05:                                                [facility] 

04/10                                                                                             

04:07 Order name: CT Stone Protocol                                                           [facility] 

04/10                                                                                             

04:07 Order name: IV Saline Lock; Complete Time: 04:13                                        [facility] 

04/10                                                                                             

04:07 Order name: Labs collected and sent; Complete Time: 04:                               [facility] 

04/10                                                                                             

04:07 Order name: Urine Dipstick-Ancillary (obtain specimen); Complete Time: :            [facility] 

                                                                                                  

Administered Medications:                                                                         

04:18 Drug: NS 0.9% 1000 ml Route: IV; Rate: 1000 ml; Site: right antecubital;                sf  

05:15 Follow up: Response: No adverse reaction; IV Status: Completed infusion; IV Intake:     sf  

      1000ml                                                                                      

04:19 Drug: morphine 4 mg Route: IVP; Site: right antecubital;                                sf  

05:15 Follow up: Response: No adverse reaction; Pain is decreased                             sf  

04:19 Drug: Zofran (Ondansetron) 4 mg Route: IVP; Site: right antecubital;                    sf  

05:15 Follow up: Response: No adverse reaction                                                sf  

                                                                                                  

                                                                                                  

Disposition:                                                                                      

04/10/21 06:22 Discharged to Home. Impression: Flank Pain, Left, Lower abdominal pain,            

  unspecified, Musculoskeletal Pain.                                                              

- Condition is Stable.                                                                            

- Discharge Instructions: Musculoskeletal Pain, Abdominal Pain, Adult, Easy-to-Read,              

  Flank Pain, Easy-to-Read.                                                                       

- Prescriptions for ketorolac 10 mg Oral tablet - take 1 tablet by ORAL route every 8             

  hours As needed not to exceed 40 mg in 24hrs; 12 tablet. Robaxin 500 mg Oral Tablet -           

  take 1 tablet by ORAL route every 6 hours As needed; 20 tablet.                                 

- Medication Reconciliation Form, Thank You Letter, Antibiotic Education, Prescription            

  Opioid Use, Work release form form.                                                             

- Follow up: Private Physician; When: 1 - 2 days; Reason: Worsening of condition,                 

  Recheck today's complaints, Continuance of care, Re-evaluation by your physician.               

- Problem is new.                                                                                 

- Symptoms have improved.                                                                         

                                                                                                  

                                                                                                  

                                                                                                  

Signatures:                                                                                       

Dispatcher [employer]                           EDJoycelyn Carpio RN                     RN   bb                                                   

Ever Gerardo MD MD   7                                                  

Ponce Christensen RN                 RN   sf                                                   

                                                                                                  

Corrections: (The following items were deleted from the chart)                                    

06:23 06:22 04/10/2021 06:22 Discharged to Home. Impression: Flank Pain, Left; Lower          mh7 

      abdominal pain, unspecified. Condition is Stable. Forms are Work release form,              

      Medication Reconciliation Form, Thank You Letter, Antibiotic Education, Prescription        

      Opioid Use. Follow up: Private Physician; When: 1 - 2 days; Reason: Worsening of            

      condition, Recheck today's complaints, Continuance of care, Re-evaluation by your           

      physician. Problem is new. Symptoms have improved. [facility]                                      

06:35 06:23 04/10/2021 06:22 Discharged to Home. Impression: Flank Pain, Left; Lower          sf  

      abdominal pain, unspecified; Musculoskeletal Pain. Condition is Stable. Discharge           

      Instructions: Musculoskeletal Pain, Abdominal Pain, Adult, Easy-to-Read, Flank Pain,        

      Easy-to-Read. Forms are Work release form, Medication Reconciliation Form, Thank You        

      Letter, Antibiotic Education, Prescription Opioid Use. Follow up: Private Physician;        

      When: 1 - 2 days; Reason: Worsening of condition, Recheck today's complaints,               

      Continuance of care, Re-evaluation by your physician. Problem is new. Symptoms have         

      improved. [facility]                                                                               

                                                                                                  

**************************************************************************************************

## 2021-04-10 NOTE — XMS REPORT
Continuity of Care Document

                            Created on:April 10, 2021



Patient:GERMÁN THRASHER

Sex:Male

:1990

External Reference #:[id number]





Demographics







                          Address                   [address] [place] [address]

 

                          Home Phone                [phone]

 

                          Work Phone                [phone]

 

                          Mobile Phone              [phone]

 

                          Email Address             [email or web address]

 

                          Preferred Language        English

 

                          Marital Status            Unknown

 

                          Holiness Affiliation     000

 

                          Race                      Unknown

 

                          Additional Race(s)        Unavailable



                                                    White

 

                          Ethnic Group              Not  or 









Author







                          Organization              [facility]

t

 

                          Address                   [address]



                                                    [place] [address]

 

                          Phone                     [phone]









Support







                Name            Relationship    Address         Phone

 

                Shabbir         Mother          [address] [phone]

[phone]



                                                [place] [address] 

 

                Pcsalvador          Spouse          Unavailable     [phone]

 

                No              Unknown         Unavailable     [phone]









Care Team Providers







                    Name                Role                Phone

 

                    Peter SHIRLEY,  Fajamee   Primary Care Physician [phone]

 

                    Rosalie Wu  Attending Clinician [phone]

 

                    Nurse,  P[facility] Attending Clinician Unavailabl

e









Problems







       Condition Condition Condition Status Onset  Resolution Last   Treating Co

mments 

Source



       Name   Details Category        Date   Date   Treatment Clinician        



                                                 Date                 

 

       Weakness Weakness Disease Active                              [place]

on



                                   4-10                               Methodi



                                   00:00:                             st



                                   00                                 

 

       Chronic Chronic Disease Active                              [place]



       bilateral bilateral               4-10                               Meth

oswaldo



       low back low back               00:00:                             st



       pain   pain                 00                                 



       without without                                                  



       sciatica sciatica                                                  

 

       Bipolar Bipolar Disease Active                                    [place]



       depression depression                                                  Me

thodi



                                                                      st







Allergies, Adverse Reactions, Alerts







       Allergy Allergy Status Severity Reaction(s) Onset  Inactive Treating Comm

ents 

Source



       Name   Type                        Date   Date   Clinician        

 

       Penicill Propensi Active        Swelling                       [place]ous

ton



       ins    ty to                       4-10                        Methodi



              adverse                      00:00:                      st



              reaction                      00                          



              s to                                                    



              drug                                                    

 

       Sulfa  Propensi Active        Anaphylaxis                       Awilda

ston



       (Sulfona ty to                       4-10                        Methodi



       mide   adverse                      00:00:                      st



       Antibiot reaction                      00                          



       ics)   s to                                                    



              drug                                                    







Family History







           Family Member Diagnosis  Comments   Start Date Stop Date  Source

 

           Natural brother No Known Problems                                  Mendoza urrutia [facility]ism

 

           Natural father Hypertension                                  [place] 

Mu-ism

 

           Natural mother Fibromyalgia                                  [place] 

Mu-ism

 

           Natural sister Other                                       [facility]







Social History







           Social Habit Start Date Stop Date  Quantity   Comments   Source

 

           Alcohol intake 2017-04-10 2017-04-10 Current drinker            Gi

on Mu-ism



                      00:00:00   00:00:00   of alcohol            



                                            (finding)             

 

           Tobacco use and 2017-04-10 2017-04-10 Never used            Christian COSME

ethodist



           exposure   00:00:00   00:00:00                         

 

           Sex Assigned At 1990                       Christian COSME

ethodist



           Birth      00:00:00   00:00:00                         









                Smoking Status  Start Date      Stop Date       Source

 

                Never smoker                                    Christian Methodis

t







Medications







       Ordered Filled Start  Stop   Current Ordering Indication Dosage Frequency

 Signature

                    Comments            Components          Source



     Medication Medication Date Date Medication? Clinician                (SIG) 

          



     Name Name                                                   

 

     lamoTRIgine            Yes            25mg Q.5D Take 25 mg           

Gonzales



     (LaMICtal)      4-10                               by mouth 2           Met

hodi



     25 MG      15:15:                               (two)           st



     tablet      06                                 times a           



                                                  day.           

 

     venlafaxine            Yes            50mg Q.5D Take 50 mg           

Gonzales



     (EFFEXOR)      4-10                               by mouth 2           Meth

oswaldo



     50 MG      15:15:                               (two)           st



     tablet      06                                 times a           



                                                  day.           







Procedures

This patient has no known procedures.



Plan of Care







             Planned Activity Planned Date Details      Comments     Source

 

             Future Scheduled 2021   INFLUENZA VACCINE              Josetteto

n Mu[facility]ism



             Test         00:00:00     [code = INFLUENZA              



                                       VACCINE]                  

 

             Future Scheduled 2008   COVID-19 VACCINE (1)              Awilda delacruz [facility]



             Test         00:00:00     [code = COVID-19              



                                       VACCINE (1)]              







Encounters







        Start   End     Encounter Admission Attending Care    Care    Encounter 

Source



        Date/Time Date/Time Type    Type    Clinicians Facility Department ID   

   

 

        2020 Emergency         JANETTE Andrade [facility]    [id number]

[id number] 



        23:36:00 06:41:00                 Rosalie Parker [id number]         



                                                [place] [id number]         



                                                [place]  [phone]         



                                                        084             

 

        2020 Laboratory         NurseSascha [facility]    [id number] 

[id number] 



        14:40:23 14:55:23 Only            Pcp     PRIMARY [id number]         



                                        Assessment CARE    [id number]         



                                        Clinic  [place] [phone]         



                                                        042             







Results

This patient has no known results.

## 2021-04-10 NOTE — ER
Nurse's Notes                                                                                     

 [facility]                                                                 

Name: Diogo Shae Jr                                                                              

Age: 30 yrs                                                                                       

Sex: Male                                                                                         

: 1990                                                                                   

MRN: [id number]                                                                                   

Arrival Date: 04/10/2021                                                                          

Time: 03:36                                                                                       

Account#: [id number]                                                                            

Bed 18                                                                                            

Private MD:                                                                                       

Diagnosis: Flank Pain, Left;Lower abdominal pain, unspecified;Musculoskeletal Pain                

                                                                                                  

Presentation:                                                                                     

04/10                                                                                             

03:48 Chief complaint: Patient states: he started having left flank pain since Thursday the   bb  

      pain is getting worse denies dysuria. Coronavirus screen: At this time, the client does     

      not indicate any symptoms associated with coronavirus-19. Ebola Screen: No symptoms or      

      risks identified at this time. Initial Sepsis Screen: Does the patient meet any 2           

      criteria? No. Patient's initial sepsis screen is negative. Does the patient have a          

      suspected source of infection? No. Patient's initial sepsis screen is negative. Risk        

      Assessment: Do you want to hurt yourself or someone else? Patient reports no desire to      

      harm self or others. Onset of symptoms was 2021.                                  

03:48 Method Of Arrival: Ambulatory                                                           bb  

03:48 Acuity: BETSEY 3                                                                           bb  

                                                                                                  

Historical:                                                                                       

- Allergies:                                                                                      

03:51 PENICILLINS;                                                                            bb  

03:51 Sulfa (Sulfonamide Antibiotics);                                                        bb  

- Home Meds:                                                                                      

03:51 Effexor Oral [Active]; Trazodone Oral [Active];                                         bb  

- PMHx:                                                                                           

03:51 PTSD, panic disorder depressive mode; Anxiety; insommnia;                               bb  

- PSHx:                                                                                           

03:51 Ear Tubes; Tonsillectomy;                                                               bb  

                                                                                                  

- Immunization history:: Adult Immunizations up to date.                                          

- Social history:: Smoking status: Patient denies any tobacco usage or history of.                

  Patient uses alcohol, occasionally. Patient/guardian denies using street drugs.                 

                                                                                                  

                                                                                                  

Screenin:45 Abuse screen: Denies threats or abuse. Denies injuries from another. Nutritional        sf  

      screening: No deficits noted. Tuberculosis screening: No symptoms or risk factors           

      identified. Never had TB. Possible symptoms: None Risk factors: None. Fall Risk None        

      identified. No fall in past 12 months (0 pts). No secondary diagnosis (0 pts). IV           

      access (20 points). Ambulatory Aid- None/Bed Rest/Nurse Assist (0 pts). Gait-               

      Normal/Bed Rest/Wheelchair (0 pts) Mental Status- Oriented to own ability (0 pts).          

      Total Leonard Fall Scale indicates No Risk (0-24 pts).                                        

                                                                                                  

Assessment:                                                                                       

03:45 General: Appears uncomfortable, Behavior is calm, cooperative. Pain: Complains of pain  sf  

      in anterior aspect of left lateral abdomen and left lower quadrant Pain currently is 6      

      out of 10 on a pain scale. Quality of pain is described as sharp, stabbing. Neuro: No       

      deficits noted. Level of Consciousness is awake, alert, Oriented to person, place,          

      time, situation. Cardiovascular: No deficits noted. Patient's skin is warm and dry.         

      Respiratory: No deficits noted. Airway is patent Respiratory effort is even, unlabored,     

      Respiratory pattern is regular, symmetrical. GI: Abdomen is distended, Abd is soft X 4      

      quads Abdomen is tender to palpation in anterior aspect of left lateral abdomen and         

      left lower quadrant Reports diarrhea, Patient currently denies normal bowel habits. :     

      CVA tenderness noted on left Denies burning with urination, inability to void, pain         

      urinary frequency, urgency. EENT: No signs and/or symptoms were reported regarding the      

      EENT system. Derm: No signs and/or symptoms reported regarding the dermatologic system.     

      Musculoskeletal: No signs and/or symptoms reported regarding the musculoskeletal system.    

04:15 Reassessment: Patient appears in no apparent distress at this time. No changes from     sf  

      previously documented assessment. Patient and/or family updated on plan of care and         

      expected duration. Pain level reassessed. Patient is alert, oriented x 3, equal             

      unlabored respirations, skin warm/dry/pink.                                                 

05:15 Reassessment: Patient appears in no apparent distress at this time. Patient and/or      sf  

      family updated on plan of care and expected duration. Pain level reassessed. Patient is     

      alert, oriented x 3, equal unlabored respirations, skin warm/dry/pink. Patient states       

      feeling better. Patient states symptoms have improved.                                      

06:20 Reassessment: Patient appears in no apparent distress at this time. No changes from     sf  

      previously documented assessment. Patient and/or family updated on plan of care and         

      expected duration. Pain level reassessed. Patient is alert, oriented x 3, equal             

      unlabored respirations, skin warm/dry/pink.                                                 

                                                                                                  

Vital Signs:                                                                                      

03:48  / 91; Pulse 72; Resp 20 S; Temp 97.9(O); Pulse Ox 98% on R/A; Weight 104.33 kg   bb  

      (R); Height 6 ft. 1 in. (185.42 cm) (R); Pain 10/10;                                        

04:00  / 79; Pulse 72; Resp 16; Pulse Ox 98% ;                                          sf  

04:34  / 74; Pulse 70; Resp 16; Pulse Ox 96% ;                                          sf  

05:00  / 70; Pulse 59; Resp 16; Pulse Ox 96% ;                                          sf  

05:15 Pain 5/10;                                                                              sf  

05:31  / 60; Pulse 67; Resp 16; Pulse Ox 96% ;                                          sf  

06:00  / 64; Pulse 60; Resp 16; Pulse Ox 95% ;                                          sf  

03:48 Body Mass Index 30.34 (104.33 kg, 185.42 cm)                                            bb  

                                                                                                  

ED Course:                                                                                        

03:36 Patient arrived in ED.                                                                  cf2 

03:45 Patient has correct armband on for positive identification. Bed in low position. Call   sf  

      light in reach. Side rails up X 1. Pulse ox on. NIBP on. Door closed. Noise minimized.      

      Visitors limited. Lights dimmed. Verbal reassurance given.                                  

03:46 Ponce Christensen RN is Primary Nurse.                                               sf  

03:50 Triage completed.                                                                       bb  

03:51 Ever Gerardo MD is Attending Physician.                                             [facility] 

03:51 Arm band placed on Patient placed in an exam room, on a stretcher, on pulse oximetry.   bb  

03:55 Initial lab(s) drawn, by me, held in ED. Inserted saline lock: 20 gauge in right        sf  

      antecubital area, using aseptic technique. Blood collected.                                 

04:20 Urine collected: clean catch specimen, clear.                                           sf  

04:34 CT Stone Protocol Sent.                                                                 sf  

04:46 CT Stone Protocol In Process Unspecified.                                               EDMS

06:34 No provider procedures requiring assistance completed. IV discontinued, intact,         sf  

      bleeding controlled, No redness/swelling at site. Pressure dressing applied.                

                                                                                                  

Administered Medications:                                                                         

04:18 Drug: NS 0.9% 1000 ml Route: IV; Rate: 1000 ml; Site: right antecubital;                sf  

05:15 Follow up: Response: No adverse reaction; IV Status: Completed infusion; IV Intake:     sf  

      1000ml                                                                                      

04:19 Drug: morphine 4 mg Route: IVP; Site: right antecubital;                                sf  

05:15 Follow up: Response: No adverse reaction; Pain is decreased                             sf  

04:19 Drug: Zofran (Ondansetron) 4 mg Route: IVP; Site: right antecubital;                    sf  

05:15 Follow up: Response: No adverse reaction                                                sf  

                                                                                                  

                                                                                                  

Intake:                                                                                           

05:15 IV: 1000ml; Total: 1000ml.                                                              sf  

                                                                                                  

Outcome:                                                                                          

06:22 Discharge ordered by MD. harden 

06:34 Discharged to home ambulatory.                                                          sf  

06:34 Condition: stable                                                                           

06:34 Discharge instructions given to patient, Instructed on discharge instructions, follow       

      up and referral plans. medication usage, Demonstrated understanding of instructions,        

      follow-up care, medications, Prescriptions given X 2.                                       

06:35 Patient left the ED.                                                                    sf  

                                                                                                  

Signatures:                                                                                       

Dispatcher [employer]                           EDMS                                                 

Joycelyn Patel, RN                     RN   bb                                                   

Sandy Ley                             cf2                                                  

Ever Gerardo MD MD   Ponce Sanchez RN                 RN   sf                                                   

                                                                                                  

**************************************************************************************************

## 2021-04-12 NOTE — RAD REPORT
EXAM DESCRIPTION:  CT - Stone Protocol - 4/10/2021 6:25 am

 

CLINICAL HISTORY:  The patient is 30 years old and is Male; FLANK PAIN

 

TECHNIQUE:  Axial computed tomography images of the abdomen and pelvis without intravenous contrast. 
  Sagittal and coronal reformatted images were created and reviewed.   This CT exam was performed usi
ng one or more of the following dose reduction techniques:   automated exposure control, adjustment o
f the mA and/or kV according to patient size, and/or use of iterative reconstruction technique.

 

COMPARISON:  No relevant prior studies available.

 

FINDINGS:  Lung bases:   Unremarkable.   No mass.   No consolidation.

ABDOMEN:

   Liver:   Unremarkable.

   Gallbladder and bile ducts:   Unremarkable.   No calcified stones.   No ductal dilation.

   Pancreas:   Unremarkable.   No ductal dilation.

   Spleen:   Unremarkable.   No splenomegaly.

   Adrenals:   Unremarkable.   No mass.

   Kidneys and ureters:   Unremarkable.   No obstructing stones.   No hydronephrosis.

   Stomach and bowel:   Unremarkable.   No obstruction.   No mucosal thickening.

PELVIS:

   Appendix:   No findings to suggest acute appendicitis.

   Bladder:   Unremarkable.   No stones.

   Reproductive:   Unremarkable as visualized.

ABDOMEN and PELVIS:

   Intraperitoneal space:   Unremarkable.   No free air.   No significant fluid collection.

   Bones/joints:   No acute fracture.   No dislocation.

   Soft tissues:   Unremarkable.

   Vasculature:   Unremarkable.   No abdominal aortic aneurysm.

   Lymph nodes:   Unremarkable.   No enlarged lymph nodes.

 

IMPRESSION:  Normal abdomen and pelvis CT.

 

Electronically signed by:   Russell Pradhan MD   4/10/2021 6:06 AM CDT Workstation: [id number]

 

 

Due to temporary technical issues with the PACS/Fluency reporting system, reports are being signed by
 the in house radiologist without review as a courtesy to ensure prompt reporting. The interpreting r
adiologist is fully responsible for the content of the report.

## 2021-05-18 ENCOUNTER — HOSPITAL ENCOUNTER (EMERGENCY)
Dept: HOSPITAL 97 - ER | Age: 31
Discharge: HOME | End: 2021-05-18
Payer: SELF-PAY

## 2021-05-18 VITALS — TEMPERATURE: 98.3 F

## 2021-05-18 VITALS — DIASTOLIC BLOOD PRESSURE: 74 MMHG | OXYGEN SATURATION: 99 % | SYSTOLIC BLOOD PRESSURE: 126 MMHG

## 2021-05-18 DIAGNOSIS — J40: Primary | ICD-10-CM

## 2021-05-18 DIAGNOSIS — Z88.0: ICD-10-CM

## 2021-05-18 DIAGNOSIS — Z88.2: ICD-10-CM

## 2021-05-18 DIAGNOSIS — Z20.822: ICD-10-CM

## 2021-05-18 DIAGNOSIS — F41.0: ICD-10-CM

## 2021-05-18 LAB
BUN BLD-MCNC: 6 MG/DL (ref 7–18)
GLUCOSE SERPLBLD-MCNC: 98 MG/DL (ref 74–106)
HCT VFR BLD CALC: 41.9 % (ref 39.6–49)
LYMPHOCYTES # SPEC AUTO: 0.9 K/UL (ref 0.7–4.9)
PMV BLD: 8.6 FL (ref 7.6–11.3)
POTASSIUM SERPL-SCNC: 4 MMOL/L (ref 3.5–5.1)
RBC # BLD: 4.75 M/UL (ref 4.33–5.43)

## 2021-05-18 PROCEDURE — 85379 FIBRIN DEGRADATION QUANT: CPT

## 2021-05-18 PROCEDURE — 85025 COMPLETE CBC W/AUTO DIFF WBC: CPT

## 2021-05-18 PROCEDURE — 96374 THER/PROPH/DIAG INJ IV PUSH: CPT

## 2021-05-18 PROCEDURE — 80048 BASIC METABOLIC PNL TOTAL CA: CPT

## 2021-05-18 PROCEDURE — 99284 EMERGENCY DEPT VISIT MOD MDM: CPT

## 2021-05-18 PROCEDURE — 71046 X-RAY EXAM CHEST 2 VIEWS: CPT

## 2021-05-18 PROCEDURE — 36415 COLL VENOUS BLD VENIPUNCTURE: CPT

## 2021-05-18 NOTE — EDPHYS
Physician Documentation                                                                           

 [facility]                                                                 

Name: Diogo Shea Jr                                                                              

Age: 30 yrs                                                                                       

Sex: Male                                                                                         

: 1990                                                                                   

MRN: [id number]                                                                                   

Arrival Date: 2021                                                                          

Time: 17:47                                                                                       

Account#: [id number]                                                                            

Bed 18                                                                                            

Private MD: Doris Green F                                                                     

ED Physician Markus Benitez                                                                      

HPI:                                                                                              

                                                                                             

20:06 This 30 yrs old  Male presents to ER via Ambulatory with complaints of Cough,  jmm 

      Shortness Of Breath, Breathing Difficulty.                                                  

20:06 Onset: The symptoms/episode began/occurred gradually, 3 day(s) ago. Modifying factors:  jmm 

      The symptoms are alleviated by nothing, the symptoms are aggravated by nothing.             

      Associated signs and symptoms: Pertinent positives: sore throat.                            

                                                                                                  

Historical:                                                                                       

- Allergies:                                                                                      

18:54 PENICILLINS;                                                                            jl7 

18:54 Sulfa (Sulfonamide Antibiotics);                                                        jl7 

- Home Meds:                                                                                      

18:54 Effexor Oral [Active]; Trazodone Oral [Active];                                         jl7 

- PMHx:                                                                                           

18:54 Anxiety; insommnia; PTSD, panic disorder depressive mode;                               jl7 

- PSHx:                                                                                           

18:54 Ear Tubes; Tonsillectomy;                                                               jl7 

                                                                                                  

- Immunization history:: Adult Immunizations up to date.                                          

- Social history:: Smoking status: Patient denies any tobacco usage or history of.                

                                                                                                  

                                                                                                  

ROS:                                                                                              

20:06 Constitutional: Negative for fever, chills, and weight loss, Cardiovascular: Negative   jmm 

      for chest pain, palpitations, and edema.                                                    

20:06 Respiratory: Positive for cough.                                                            

20:06 All other systems are negative.                                                             

                                                                                                  

Exam:                                                                                             

20:06 Constitutional:  This is a well developed, well nourished patient who is awake, alert,  jmm 

      and in no acute distress. Head/Face:  atraumatic. Eyes:  EOMI, no conjunctival erythema     

      appreciated ENT:  Moist Mucus Membranes Neck:  Trachea midline, Supple Chest/axilla:        

      Normal chest wall appearance and motion.   Cardiovascular:  Regular rate and rhythm.        

      No edema appreciated                                                                        

20:06 Back:  Normal ROM Skin:  General appearance color normal MS/ Extremity:  Moves all          

      extremities, no obvious deformities appreciated, no edema noted to the lower                

      extremities  Neuro:  Awake and alert, normal gait Psych:  Behavior is normal, Mood is       

      normal, Patient is cooperative and pleasant                                                 

20:06 Respiratory: the patient does not display signs of respiratory distress,  Respirations:     

      normal, Breath sounds: wheezing: that is mild, is scattered.                                

                                                                                                  

Vital Signs:                                                                                      

18:52  / 106; Pulse 98; Resp 16; Temp 98.3(O); Pulse Ox 98% on R/A; Weight 104.33 kg;   [facility] 

      Height 6 ft. 1 in. (185.42 cm); Pain 4/10;                                                  

21:29  / 74; Pulse 87; Resp 18; Pulse Ox 99% on R/A;                                    wh  

18:52 Body Mass Index 30.35 (104.33 kg, 185.42 cm)                                            [facility] 

                                                                                                  

MDM:                                                                                              

20:16 Patient medically screened.                                                             [facility] 

22:05 Data reviewed: vital signs, nurses notes. Counseling: I had a detailed discussion with  shira 

      the patient and/or guardian regarding: the historical points, exam findings, and any        

      diagnostic results supporting the discharge/admit diagnosis, lab results, radiology         

      results, the need for outpatient follow up, to return to the emergency department if        

      symptoms worsen or persist or if there are any questions or concerns that arise at          

      home. ED course: Patient states feeling much better. Patient advised to follow up with      

      pcp and otherwise given strict return precautions. Patient understood and agrees with       

      the plan of care. .                                                                         

                                                                                                  

                                                                                             

20:22 Order name: CBC with Diff                                                               [facility] 

                                                                                             

20:22 Order name: BMP                                                                         [facility] 

                                                                                             

20:22 Order name: D-Dimer                                                                     [facility] 

                                                                                             

20:22 Order name: Flu                                                                         [facility] 

                                                                                             

20:22 Order name: COVID-19 : Document "Date of Symptom Onset" if Symptomatic.                 [facility] 

                                                                                             

20:56 Order name: CORONAVIRUS                                                                 [facility]

                                                                                             

19:00 Order name: XRAY Chest Pa And Lat (2 Views)                                             [facility] 

                                                                                             

19:45 Order name: RAD; Complete Time: 20:16                                                   [facility]

                                                                                             

20:56 Order name: Influenza Screen (A                                                         [facility]

                                                                                             

21:02 Order name: CBC with Automated Diff; Complete Time: 21:04                               [facility]

                                                                                             

21:08 Order name: Basic Metabolic Panel; Complete Time: 21:11                                 [facility]

                                                                                             

21:11 Order name: D-Dimer; Complete Time: 21:11                                               [facility]

                                                                                             

21:42 Order name: SARS-COV-2 RT PCR; Complete Time: 21:43                                     [facility]

                                                                                             

20:22 Order name: Saline Lock                                                                 [facility] 

                                                                                                  

Administered Medications:                                                                         

20:37 Drug: Xopenex (levalbuterol) (3) 1.25 mg Route: Inhalation;                               

20:37 Drug: SOLU-Medrol (methylPrednisoLONE) 125 mg Route: IVP; Site: left antecubital;         

                                                                                                  

                                                                                                  

Disposition:                                                                                      

                                                                                             

16:48 Co-signature as Attending Physician, Markus Benitez MD I agree with the assessment and  tonia 

      plan of care.                                                                               

                                                                                                  

Disposition:                                                                                      

21 22:24 Discharged to Home. Impression: Bronchitis, not specified as acute or chronic.     

- Condition is Stable.                                                                            

                                                                                                  

                                                                                                  

- Medication Reconciliation Form, Thank You Letter, Antibiotic Education, Prescription            

  Opioid Use form.                                                                                

                                                                                                  

                                                                                                  

                                                                                                  

                                                                                                  

Signatures:                                                                                       

Dispatcher [employer]                           EDMarkus Delgado MD MD cha Mickail, Joel, PA PA jmm Leal, Jahala, RN                        RN   jl                                                  

Filomena Ladd RN                       RN                                                      

                                                                                                  

Corrections: (The following items were deleted from the chart)                                    

                                                                                             

22:23 22:06 2021 22:06 Discharged to Home. Impression: Acute bronchitis. Condition is     

      Stable. Forms are Medication Reconciliation Form, Thank You Letter, Antibiotic              

      Education, Prescription Opioid Use. Follow up: Private Physician; When: 2 - 3 days;         

      Reason: Recheck today's complaints, Continuance of care, Re-evaluation by your              

      physician. shira                                                                              

                                                                                                  

**************************************************************************************************

## 2021-05-18 NOTE — ER
Nurse's Notes                                                                                     

 [facility]                                                                 

Name: Diogo Shea Jr                                                                              

Age: 30 yrs                                                                                       

Sex: Male                                                                                         

: 1990                                                                                   

MRN: [id number]                                                                                   

Arrival Date: 2021                                                                          

Time: 17:47                                                                                       

Account#: [id number]                                                                            

Bed 18                                                                                            

Private MD: Doris Green F                                                                     

Diagnosis: Bronchitis, not specified as acute or chronic                                          

                                                                                                  

Presentation:                                                                                     

                                                                                             

18:52 Chief complaint: Patient states: Sore throat on , non-productive cough, shortness jl7 

      of breath, denies fever, denies N/V/D. Coronavirus screen: cough unrelated to               

      allergies, sore throat, Client presents with at least one sign or symptom that may          

      indicate coronavirus-19. Standard/surgical mask placed on the client. Provider              

      contacted for isolation considerations. Ebola Screen: No symptoms or risks identified       

      at this time. Initial Sepsis Screen: Does the patient meet any 2 criteria? No.              

      Patient's initial sepsis screen is negative. Does the patient have a suspected source       

      of infection? No. Patient's initial sepsis screen is negative. Risk Assessment: Do you      

      want to hurt yourself or someone else? Patient reports no desire to harm self or            

      others. Onset of symptoms was May 16, 2021.                                                 

18:52 Method Of Arrival: Ambulatory                                                           jl7 

18:52 Acuity: BETSEY 3                                                                           jl7 

                                                                                                  

Historical:                                                                                       

- Allergies:                                                                                      

18:54 PENICILLINS;                                                                            jl7 

18:54 Sulfa (Sulfonamide Antibiotics);                                                        jl7 

- Home Meds:                                                                                      

18:54 Effexor Oral [Active]; Trazodone Oral [Active];                                         jl7 

- PMHx:                                                                                           

18:54 Anxiety; insommnia; PTSD, panic disorder depressive mode;                               jl7 

- PSHx:                                                                                           

18:54 Ear Tubes; Tonsillectomy;                                                               jl7 

                                                                                                  

- Immunization history:: Adult Immunizations up to date.                                          

- Social history:: Smoking status: Patient denies any tobacco usage or history of.                

                                                                                                  

                                                                                                  

Screenin:05 Abuse screen: Denies threats or abuse. Denies injuries from another. Nutritional        wh  

      screening: No deficits noted. Tuberculosis screening: No symptoms or risk factors           

      identified. Fall Risk None identified.                                                      

                                                                                                  

Vital Signs:                                                                                      

18:52  / 106; Pulse 98; Resp 16; Temp 98.3(O); Pulse Ox 98% on R/A; Weight 104.33 kg;   jl7 

      Height 6 ft. 1 in. (185.42 cm); Pain 4/10;                                                  

21:29  / 74; Pulse 87; Resp 18; Pulse Ox 99% on R/A;                                      

18:52 Body Mass Index 30.35 (104.33 kg, 185.42 cm)                                            [facility] 

                                                                                                  

ED Course:                                                                                        

17:47 Patient arrived in ED.                                                                  am2 

17:47 Doris Green MD is Private Physician.                                                am2 

18:54 Triage completed.                                                                       jl7 

18:54 Arm band placed on right wrist. Patient placed in waiting room, Patient notified of     [facility] 

      wait time.                                                                                  

20:01 Niraj Wadsworth PA is PHCP.                                                              [facility] 

20:01 Markus Benitez MD is Attending Physician.                                             [facility] 

20:04 Filomena Ladd, RN is Primary Nurse.                                                       

20:05 No apparent distress.                                                                     

20:05 Patient has correct armband on for positive identification.                               

20:05 No provider procedures requiring assistance completed.                                    

                                                                                                  

Administered Medications:                                                                         

20:37 Drug: Xopenex (levalbuterol) (3) 1.25 mg Route: Inhalation;                               

20:37 Drug: SOLU-Medrol (methylPrednisoLONE) 125 mg Route: IVP; Site: left antecubital;         

                                                                                                  

                                                                                                  

Outcome:                                                                                          

22:06 Discharge ordered by MD.                                                                [facility] 

22:23 Discharged to home ambulatory.                                                            

22:23 Condition: good                                                                             

22:23 Discharge instructions given to patient, Prescriptions given X 2.                           

22:24 Discharge ordered by MD.                                                                  

22:24 Patient left the ED.                                                                      

                                                                                                  

Signatures:                                                                                       

Niraj Wadsworth PA PA jmm Leal, Jahala RN                        SUE   [facility]                                                  

Alla Crump                               am2                                                  

Filomena Ladd RN RN                                                      

                                                                                                  

**************************************************************************************************

## 2021-05-18 NOTE — XMS REPORT
Continuity of Care Document

                             Created on:May 18, 2021



Patient:GERMÁN THRASHER

Sex:Male

:1990

External Reference #:[id number]





Demographics







                          Address                   [address]



                                                    [place] [address]

 

                          Home Phone                [phone]

 

                          Work Phone                [phone]

 

                          Mobile Phone              [phone]

 

                          Email Address             NONE

 

                          Preferred Language        English

 

                          Marital Status            Unknown

 

                          Buddhism Affiliation     000

 

                          Race                      Unknown

 

                          Additional Race(s)        Unavailable



                                                    White

 

                          Ethnic Group              Not  or 









Author







                          Organization              [facility]

t

 

                          Address                   [address]



                                                    [place] [address]

 

                          Phone                     [phone]









Support







                Name            Relationship    Address         Phone

 

                Shabbir         Mother          [address] [phone]

[phone]



                                                [place] [address] 

 

                Pcketmanasa          Spouse          Unavailable     [phone]

 

                Shabbir         Spouse          [address]  Unavailable



                                                [place] [address] 

 

                UN              Unavailable     Unavailable     Unavailable

 

                No              Unknown         Unavailable     [phone]









Care Team Providers







                    Name                Role                Phone

 

                    Dennys Green MD   Primary Care Physician [phone]

 

                    Rosalie Wu  Attending Clinician [phone]

 

                    Nurse,  [facility] Attending Clinician Unavailabl

e









Problems







       Condition Condition Condition Status Onset  Resolution Last   Treating Co

mments 

Source



       Name   Details Category        Date   Date   Treatment Clinician        



                                                 Date                 

 

       Weakness Weakness Disease Active                              [place]

on



                                   410                               Methodi



                                   00:00:                             st



                                   00                                 

 

       Chronic Chronic Disease Active                              Mil[place]



       bilateral bilateral               4-10                               Meth

oswaldo



       low back low back               00:00:                             st



       pain   pain                 00                                 



       without without                                                  



       sciatica sciatica                                                  

 

       Bipolar Bipolar Disease Active                                    Milan



       depression depression                                                  Me





                                                                      st







Allergies, Adverse Reactions, Alerts







       Allergy Allergy Status Severity Reaction(s) Onset  Inactive Treating Comm

ents 

Source



       Name   Type                        Date   Date   Clinician        

 

       No Known DA     Active U                                   SJm



       Drug                               4-20                        



       Allergie                             00:00:                      



       s                                  00                          

 

       Penicill Propensi Active        Swelling                       [place]

[place]



       ins    ty to                       4-10                        Methodi



              adverse                      00:00:                      st



              reaction                      00                          



              s to                                                    



              drug                                                    

 

       Sulfa  Propensi Active        Anaphylaxis                       [place]

[place]



       (Sulfona ty to                       4-10                        Methodi



       mide   adverse                      00:00:                      st



       Antibiot reaction                      00                          



       ics)   s to                                                    



              drug                                                    







Family History







           Family Member Diagnosis  Comments   Start Date Stop Date  Source

 

           Natural brother No Known Problems                                  [place]

[place]

 

           Natural father Hypertension                                  [place] 

Amish

 

           Natural mother Fibromyalgia                                  [place] 

Amish

 

           Natural sister Other                                       Milan Me

thodist







Social History







           Social Habit Start Date Stop Date  Quantity   Comments   Source

 

           Alcohol intake 2017-04-10 2017-04-10 Current drinker            Jos[place]

on [place]ish



                      00:00:00   00:00:00   of alcohol            



                                            (finding)             

 

           Tobacco use and 2017-04-10 2017-04-10 Never used            Christian COSME

ethodist



           exposure   00:00:00   00:00:00                         

 

           Sex Assigned At 1990                       Christian COSME

ethodist



           Birth      00:00:00   00:00:00                         









                Smoking Status  Start Date      Stop Date       Source

 

                Never smoker                                    Christian Methodis

t







Medications







       Ordered Filled Start  Stop   Current Ordering Indication Dosage Frequency

 Signature

                    Comments            Components          Source



     Medication Medication Date Date Medication? Clinician                (SIG) 

          



     Name Name                                                   

 

     lamoTRIgine            Yes            25mg Q.5D Take 25 mg           

[place]



     (LaMICtal)      4-10                               by mouth 2           Met

hodi



     25 MG      15:15:                               (two)           st



     tablet      06                                 times a           



                                                  day.           

 

     venlafaxine            Yes            50mg Q.5D Take 50 mg           

[place]



     (EFFEXOR)      4-10                               by mouth 2           Meth

oswaldo



     50 MG      15:15:                               (two)           st



     tablet      06                                 times a           



                                                  day.           







Vital Signs







             Vital Name   Observation Time Observation Value Comments     Source

 

             02 Sat by Pulse Oximetry 2021 07:19:24 98 /min               

    

 

             Pulse Rate   2021 07:19:24 74 /min                   

 

             Respiratory Rate 2021 07:19:24 18 /min                   

 

             Temperature  2021 07:19:24 36.9\S\98.4               

 

             Weight       2021 07:19:24 66529.359\S\3440              

 

             02 Sat by Pulse Oximetry 2021 07:06:07 98 /min               

    

 

             Pulse Rate   2021 07:06:07 74 /min                   

 

             Respiratory Rate 2021 07:06:07 18 /min                   

 

             Temperature  2021 07:06:07 36.9\S\98.4               

 

             Weight       2021 07:06:07 82804.359\S\3440              

 

             02 Sat by Pulse Oximetry 2021 06:57:42 98 /min               

    

 

             Pulse Rate   2021 06:57:42 74 /min                   

 

             Respiratory Rate 2021 06:57:42 18 /min                   

 

             Temperature  2021 06:57:42 36.9\S\98.4               

 

             Weight       2021 06:57:42 22616.359\S\3440              

 

             WEIGHT       2021 06:54:00 97.056220 kg              







Procedures

This patient has no known procedures.



Plan of Care







             Planned Activity Planned Date Details      Comments     Source

 

             Future Scheduled 2021   INFLUENZA VACCINE              [place]

n [place]



             Test         00:00:00     [code = INFLUENZA              



                                       VACCINE]                  

 

             Future Scheduled 2008   COVID-19 VACCINE (1)              Awilda delacruz [place]



             Test         00:00:00     [code = COVID-19              



                                       VACCINE (1)]              







Encounters







        Start   End     Encounter Admission Attending Care    Care    Encounter 

Source



        Date/Time Date/Time Type    Type    Clinicians Facility Department ID   

   

 

        2020 [facility]         [facility]    [id number]

[id number] 



        23:36:00 06:41:00                 Rosalie Parker [id number]         



                                                [place] [id number]         



                                                [place]  [phone]         



                                                        084             

 

        2020 Laboratory         Nurse, [facility]    [id number] 

[id number] 



        14:40:23 14:55:23 Only            Pcp     PRIMARY [id number]         



                                        Assessment CARE    [id number]         



                                        [facility] [phone]         



                                                        042             







Results

This patient has no known results.

## 2021-05-18 NOTE — RAD REPORT
EXAM DESCRIPTION:  Leda Babcock (2 Views)5/18/2021 7:21 pm

 

CLINICAL HISTORY:  Cough

 

COMPARISON:  2020

 

FINDINGS:   The lungs appear clear of acute infiltrate. The heart is normal size

 

IMPRESSION:   No acute abnormalities displayed

## 2025-01-09 ENCOUNTER — HOSPITAL ENCOUNTER (EMERGENCY)
Dept: HOSPITAL 97 - ER | Age: 35
LOS: 1 days | Discharge: HOME | End: 2025-01-10
Payer: COMMERCIAL

## 2025-01-09 DIAGNOSIS — F41.9: ICD-10-CM

## 2025-01-09 DIAGNOSIS — T40.5X5A: ICD-10-CM

## 2025-01-09 DIAGNOSIS — R07.89: ICD-10-CM

## 2025-01-09 DIAGNOSIS — R00.2: Primary | ICD-10-CM

## 2025-01-09 LAB
ALBUMIN SERPL BCP-MCNC: 3.3 G/DL (ref 3.4–5)
ALBUMIN/GLOB SERPL: 1.1 {RATIO} (ref 1.1–1.8)
ALP SERPL-CCNC: 55 U/L (ref 45–117)
ALT SERPL W P-5'-P-CCNC: 38 U/L (ref 16–61)
ANION GAP SERPL CALC-SCNC: 13 MEQ/L (ref 5–15)
APTT BLD: 27.3 SECONDS (ref 24.3–36.9)
AST SERPL W P-5'-P-CCNC: 36 U/L (ref 15–37)
BILIRUB INDIRECT SERPL-MCNC: 0.3 MG/DL (ref 0.2–0.8)
BUN BLD-MCNC: 11 MG/DL (ref 7–18)
GLOBULIN SER CALC-MCNC: 3.1 G/DL (ref 2.3–3.5)
GLUCOSE SERPLBLD-MCNC: 152 MG/DL (ref 74–106)
HCT VFR BLD CALC: 40.2 % (ref 39.6–49)
HGB BLD-MCNC: 13.9 G/DL (ref 13.6–17.9)
INR BLD: 0.98
LYMPHOCYTES # SPEC AUTO: 1.1 K/UL (ref 0.7–4.9)
MAGNESIUM SERPL-MCNC: 1.8 MG/DL (ref 1.6–2.4)
MCH RBC QN AUTO: 30.7 PG (ref 27–35)
MCHC RBC AUTO-ENTMCNC: 34.5 G/DL (ref 32–36)
MCV RBC: 89 FL (ref 80–100)
METHAMPHET UR QL SCN: NEGATIVE
NRBC # BLD: 0 10*3/UL (ref 0–0)
NRBC BLD AUTO-RTO: 0.1 % (ref 0–0)
PMV BLD: 8.1 FL (ref 7.6–11.3)
POTASSIUM SERPL-SCNC: 3 MEQ/L (ref 3.5–5.1)
PROTHROMBIN TIME: 11 SECONDS (ref 9.4–12.5)
RBC # BLD: 4.52 M/UL (ref 4.33–5.43)
THC SERPL-MCNC: NEGATIVE NG/ML
TROPONIN I SERPL HS-MCNC: 4 PG/ML (ref ?–58.9)
UA COMPLETE W REFLEX CULTURE PNL UR: (no result)
UA DIPSTICK W REFLEX MICRO PNL UR: (no result)
WBC # BLD AUTO: 6.2 THOU/UL (ref 4.3–10.9)

## 2025-01-09 PROCEDURE — 96374 THER/PROPH/DIAG INJ IV PUSH: CPT

## 2025-01-09 PROCEDURE — 36415 COLL VENOUS BLD VENIPUNCTURE: CPT

## 2025-01-09 PROCEDURE — 71045 X-RAY EXAM CHEST 1 VIEW: CPT

## 2025-01-09 PROCEDURE — 80179 DRUG ASSAY SALICYLATE: CPT

## 2025-01-09 PROCEDURE — 85730 THROMBOPLASTIN TIME PARTIAL: CPT

## 2025-01-09 PROCEDURE — 80076 HEPATIC FUNCTION PANEL: CPT

## 2025-01-09 PROCEDURE — 85610 PROTHROMBIN TIME: CPT

## 2025-01-09 PROCEDURE — 83735 ASSAY OF MAGNESIUM: CPT

## 2025-01-09 PROCEDURE — 80048 BASIC METABOLIC PNL TOTAL CA: CPT

## 2025-01-09 PROCEDURE — 96361 HYDRATE IV INFUSION ADD-ON: CPT

## 2025-01-09 PROCEDURE — 99284 EMERGENCY DEPT VISIT MOD MDM: CPT

## 2025-01-09 PROCEDURE — 93005 ELECTROCARDIOGRAM TRACING: CPT

## 2025-01-09 PROCEDURE — 82077 ASSAY SPEC XCP UR&BREATH IA: CPT

## 2025-01-09 PROCEDURE — 81001 URINALYSIS AUTO W/SCOPE: CPT

## 2025-01-09 PROCEDURE — 85025 COMPLETE CBC W/AUTO DIFF WBC: CPT

## 2025-01-09 PROCEDURE — 80143 DRUG ASSAY ACETAMINOPHEN: CPT

## 2025-01-09 PROCEDURE — 80307 DRUG TEST PRSMV CHEM ANLYZR: CPT

## 2025-01-09 PROCEDURE — 84484 ASSAY OF TROPONIN QUANT: CPT

## 2025-01-09 NOTE — RAD REPORT
EXAMINATION: ONE VIEW CHEST XR



CLINICAL INDICATION: PALPITATIONS



TECHNIQUE: Frontal chest projection is submitted. Examination is limited by patient positioning and t
echnique.



COMPARISON: 5/18/2021



FINDINGS:



The lungs are well inflated and clear. The heart is upper limit of normal in size. No displaced fract
ures identified.



IMPRESSION: 



No acute intrathoracic abnormalities. 







Reported By: Norberto Moncada 

Electronically Signed:  1/9/2025 10:05 PM

## 2025-01-10 VITALS — DIASTOLIC BLOOD PRESSURE: 72 MMHG | SYSTOLIC BLOOD PRESSURE: 119 MMHG | OXYGEN SATURATION: 95 %

## 2025-01-10 VITALS — TEMPERATURE: 97.4 F

## 2025-01-10 NOTE — ER
Nurse's Notes                                                                                     

 [facility]                                                                 

Name: Diogo Shea Jr                                                                              

Age: 34 yrs                                                                                       

Sex: Male                                                                                         

: 1990                                                                                   

MRN: [id number]                                                                                   

Arrival Date: 2025                                                                          

Time: 20:59                                                                                       

Account#: [id number]                                                                            

Bed 17                                                                                            

Private MD:                                                                                       

Diagnosis: Palpitations;Chest pain, unspecified;Adverse effect of cocaine, initial encounter      

                                                                                                  

Presentation:                                                                                     

                                                                                             

21:09 Chief complaint: Patient states: PT ARRIVE AT ER DUE TO FEELING LIKE HEART IS           br2 

      POUNDING...(PRESSURE/TIGHTNESS) PT STATES HE TOOK A SEROQUIL AND A PITOCIN BECAUSE HE       

      THOUGHT HE WAS HAVING ANXIETY. Coronavirus screen: Client denies travel out of the [place]     

      in the last 14 days. Ebola Screen: Patient negative for fever greater than or equal to      

      101.5 degrees Fahrenheit, and additional compatible Ebola Virus Disease symptoms.           

      Initial Sepsis Screen: Does the patient meet any 2 criteria? HR > 90 bpm. Does the          

      patient have a suspected source of infection? No. Patient's initial sepsis screen is        

      negative. Risk Assessment: Do you want to hurt yourself or someone else? Patient            

      reports no desire to harm self or others. Onset of symptoms.                                

21:09 Method Of Arrival: EMS: [facility]                                                    br2 

21:09 Acuity: BETSEY 3                                                                           br2 

                                                                                                  

Triage Assessment:                                                                                

21:33 General: Appears comfortable, uncomfortable, Behavior is anxious. Pain: Complains of    br2 

      pain in chest Pain does not radiate. Pain currently is 5 out of 10 on a pain scale.         

      Neuro: Chaves Agitation-Sedation Scale (RASS): 0 - Alert and Calm Level of                

      Consciousness is awake, alert, obeys commands, Oriented to person, place, time,             

      situation. Cardiovascular: Capillary refill < 3 seconds. Respiratory: Airway is patent      

      Respiratory effort is even, unlabored, Respiratory pattern is regular, symmetrical.         

                                                                                                  

Historical:                                                                                       

- Allergies:                                                                                      

21:33 PENICILLINS;                                                                            br2 

21:33 Sulfa (Sulfonamide Antibiotics);                                                        br2 

- PMHx:                                                                                           

21:33 Anxiety; insommnia; PTSD;                                                               br2 

                                                                                                  

- Immunization history:: Adult Immunizations up to date.                                          

- Infectious Disease History:: Denies.                                                            

- Social history:: Smoking status: Patient denies any tobacco usage or history of.                

  Patient uses alcohol, occasionally. street drugs, cocaine, Patient/guardian denies              

  using.                                                                                          

                                                                                                  

                                                                                                  

Screenin:09 [facility] ED Fall Risk Assessment (Adult) History of falling in the last 3 months,       br2 

      including since admission No falls in past 3 months (0 pts) Confusion or Disorientation     

      No (0 pts) Intoxicated or Sedated No (0 pts) Impaired Gait No (0 pts) Mobility Assist       

      Device Used No (0 pt) Altered Elimination No (0 pt) Score/Fall Risk Level 0 - 2 = Low       

      Risk. Abuse screen: Denies threats or abuse. Denies injuries from another. Nutritional      

      screening: No deficits noted. Tuberculosis screening: No symptoms or risk factors           

      identified.                                                                                 

                                                                                                  

Assessment:                                                                                       

21:46 Reassessment: Patient appears in no apparent distress at this time. Patient and/or      kj2 

      family updated on plan of care and expected duration. Pain level reassessed. Patient is     

      alert, oriented x 3, equal unlabored respirations, skin warm/dry/pink.                      

22:15 Reassessment: Patient and/or family updated on plan of care and expected duration. Pain br2 

      level reassessed. Patient is alert, oriented x 3, equal unlabored respirations, skin        

      warm/dry/pink. Patient states feeling better. Patient states symptoms have improved.        

23:29 Reassessment: No changes from previously documented assessment. Patient and/or family   br2 

      updated on plan of care and expected duration. Pain level reassessed. Patient is alert,     

      oriented x 3, equal unlabored respirations, skin warm/dry/pink. Patient states symptoms     

      have improved.                                                                              

01/10                                                                                             

01:02 Reassessment: No changes from previously documented assessment. Patient and/or family   br2 

      updated on plan of care and expected duration. Pain level reassessed. Patient is alert,     

      oriented x 3, equal unlabored respirations, skin warm/dry/pink. resting with eyes           

      closed.                                                                                     

                                                                                                  

Vital Signs:                                                                                      

                                                                                             

21:09  / 71; Pulse 121; Resp 18; Temp 97.4(TE); Pulse Ox 99% on R/A; Weight 113.4 kg;   br2 

      Height 6 ft. 1 in. ; Pain 5/10;                                                             

21:45  / 75; Pulse 124; Resp 18; Pulse Ox 100% on R/A;                                  kj2 

23:29  / 58; Pulse 95; Resp 18 S; Pulse Ox 98% on R/A;                                  br2 

01/10                                                                                             

00:14  / 71; Pulse 93; Resp 18 S; Pulse Ox 96% on R/A;                                  br2 

01:01  / 77; Pulse 87; Resp 18; Pulse Ox 93% on R/A;                                    br2 

01:57  / 72; Pulse 86; Resp 18; Pulse Ox 95% ;                                          br2 

                                                                                             

21:09 Body Mass Index 32.98 (113.40 kg, 185.42 cm)                                            br2 

                                                                                             

21:09 Pain Scale: Adult                                                                       br2 

                                                                                                  

ED Course:                                                                                        

                                                                                             

21:06 Patient arrived in ED.                                                                  vc1 

21:06 Carline Heaton, RN is Primary Nurse.                                                   br2 

21:07 Markus Booth PA is PHCP.                                                                cp  

21:07 Tarik Subramanian MD is Attending Physician.                                           cp  

21:09 Patient has correct armband on for positive identification. Provided Education on: PLAN br2 

      OF CARE.                                                                                    

21:09 Maintain EMS IV. Dressing intact. Site clean \T\ dry. Gauge \T\ site: 20G LEFT A/C.         br
2

21:33 Triage completed.                                                                       br2 

21:33 Arm band placed on right wrist.                                                         br2 

22:05 XRAY Chest (1 view) In Process Unspecified.                                             EDMS

01/10                                                                                             

01:30 Troponin HS Sent.                                                                       br2 

03:01 No provider procedures requiring assistance completed. IV discontinued, intact,         br2 

      bleeding controlled, No redness/swelling at site. Pressure dressing applied.                

                                                                                                  

Administered Medications:                                                                         

                                                                                             

21:45 Drug: Ativan IVP 1 mg IVP once Route: IVP; Site: left antecubital;                      kj2 

22:45 Follow up: Response: No adverse reaction                                                br2 

21:45 Drug: NS 0.9% IV 1000 ml IV at 1000 ml once; to be given as a bolus over 60 minutes     kj2 

      Route: IV; Rate: 1000 ml; Site: left antecubital;                                           

23:00 Follow up: Response: No adverse reaction; IV Status: Completed infusion; IV Intake:     br2 

      1000ml                                                                                      

22:47 Drug: NS 0.9% IV 1000 ml IV at 1000 ml once; to be given as a bolus over 60 minutes     br2 

      Route: IV; Rate: 1000 ml; Site: left antecubital;                                           

01/10                                                                                             

00:05 Follow up: IV Status: Completed infusion; IV Intake: 1000ml                             br2 

                                                                                             

22:50 Drug: Potassium PO Effervescent Tablet 50 mEq PO once; dissolve in 4 ounces of water or br2 

      juice Route: PO;                                                                            

23:30 Follow up: Response: No adverse reaction                                                br2 

                                                                                                  

                                                                                                  

Medication:                                                                                       

01/10                                                                                             

03:02 VIS not applicable for this client.                                                     br2 

                                                                                                  

Intake:                                                                                           

                                                                                             

23:00 IV: 1000ml; Total: 1000ml.                                                              br2 

01/10                                                                                             

00:05 IV: 1000ml; Total: 2000ml.                                                              br2 

                                                                                                  

Outcome:                                                                                          

02:30 Discharge ordered by MD.                                                                aniyah  

03:01 Discharged to home via wheelchair,                                                      br2 

03:01 Condition: good                                                                             

03:01 Discharge instructions given to patient, Instructed on discharge instructions, follow       

      up and referral plans. Demonstrated understanding of instructions, follow-up care,          

03:07 Patient left the ED.                                                                    br2 

                                                                                                  

Signatures:                                                                                       

Dispatcher [employer]                           EDMS                                                 

Markus Booth PA PA cp Calcote, Vanessa RN                    RN   vc1                                                  

Carline Heaton RN                     RN   br2                                                  

Lisa Wood RN                     RN   kj2                                                  

                                                                                                  

**************************************************************************************************

## 2025-01-10 NOTE — EDPHYS
Physician Documentation                                                                           

 [facility]                                                                 

Name: Diogo Shea Jr                                                                              

Age: 34 yrs                                                                                       

Sex: Male                                                                                         

: 1990                                                                                   

MRN: [id number]                                                                                   

Arrival Date: 2025                                                                          

Time: 20:59                                                                                       

Account#: [id number]                                                                            

Bed 17                                                                                            

Private MD:                                                                                       

ED Physician Tarik Subramanian                                                                    

HPI:                                                                                              

                                                                                             

21:15 This 34 yrs old  Male presents to ER via EMS with complaints of Palpitations    cp  

      and Chest Tightness.                                                                        

21:15 The patient presents with a history of heart racing. Context: The symptoms occur at     cp  

      rest. Onset: The symptoms/episode began/occurred Onset: The symptoms/episode                

      began/occurred tonight.                                                                     

21:15 Duration: The patient or guardian reports a single episode, that is still ongoing, and  cp  

      unchanged.                                                                                  

21:15 Associated signs and symptoms: Pertinent positives: chest tightness.                    cp  

                                                                                                  

Historical:                                                                                       

- Allergies:                                                                                      

21:33 PENICILLINS;                                                                            br2 

21:33 Sulfa (Sulfonamide Antibiotics);                                                        br2 

- PMHx:                                                                                           

21:33 Anxiety; insommnia; PTSD;                                                               br2 

                                                                                                  

- Immunization history:: Adult Immunizations up to date.                                          

- Infectious Disease History:: Denies.                                                            

- Social history:: Smoking status: Patient denies any tobacco usage or history of.                

  Patient uses alcohol, occasionally. street drugs, cocaine, Patient/guardian denies              

  using.                                                                                          

                                                                                                  

                                                                                                  

ROS:                                                                                              

21:25 Cardiovascular: Positive for palpitations, chest tightness,                             cp  

21:25 Eyes: Negative for injury, pain, redness, and discharge,                                cp  

21:25 Constitutional: Negative for body aches, chills, fever, poor PO intake,                     

21:25 Respiratory: Positive for shortness of breath, Negative for cough, wheezing,                

21:25 Abdomen/GI: Negative for abdominal pain, vomiting, diarrhea, constipation,                  

21:25 Neuro: Negative for altered mental status, dizziness, headache, seizure activity,           

      syncope, weakness,                                                                          

21:25 Psych: Positive for anxiety, Negative for auditory hallucinations, visual                   

      hallucinations, homicidal ideation, suicide gesture, suicidal ideation,                     

21:25 All other systems are negative,                                                             

                                                                                                  

Exam:                                                                                             

21:10 ECG was reviewed by the Attending Physician.                                            cp  

21:28 Constitutional: The patient appears in no acute distress, alert, awake,                 cp  

      non-diaphoretic, non-toxic, well developed, well nourished, anxious, uncomfortable,         

21:28 Head/Face:  Normocephalic, atraumatic.                                                  cp  

21:28 Eyes: Periorbital structures: appear normal, Pupils: equal, round, and reactive to          

      light and accomodation, Conjunctiva: normal, no exudate, no injection, Sclera: no           

      appreciated abnormality, Lids and lashes: appear normal, bilaterally,                       

21:28 ENT: External ear(s): are unremarkable, Nose: is normal, Mouth: Lips: moist, Oral           

      mucosa: moist, Posterior pharynx: Airway: no evidence of obstruction, patent,               

21:28 Neck: ROM/movement: is normal, is supple, without pain, no range of motions                 

      limitations,                                                                                

21:28 Chest/axilla: Inspection: normal, Palpation: is normal, no crepitus, no tenderness,         

21:28 Cardiovascular: Rate: tachycardic, Rhythm: regular, Edema: is not appreciated, JVD: is      

      not appreciated,                                                                            

21:28 Respiratory: the patient does not display signs of respiratory distress,  Respirations:     

      normal, no use of accessory muscles, no retractions, labored breathing, is not present,     

      Breath sounds: are clear throughout, no decreased breath sounds, no stridor, no             

      wheezing,                                                                                   

21:28 Abdomen/GI: Inspection: abdomen appears normal, Palpation: abdomen is soft and              

      non-tender, in all quadrants,                                                               

21:28 Back: ROM is normal,                                                                        

21:28 Neuro: Orientation: to person, place \T\ time. Mentation: is normal, Motor: moves all       

      fours, strength is normal, Sensation: no obvious gross deficits,                            

21:28 Psych: Behavior/mood is anxious, Patient has no thoughts/intents to harm self or            

      others. Judgement / Insight is normal. Delusions/hallucinations are not present.            

                                                                                                  

Vital Signs:                                                                                      

21:09  / 71; Pulse 121; Resp 18; Temp 97.4(TE); Pulse Ox 99% on R/A; Weight 113.4 kg;   br2 

      Height 6 ft. 1 in. ; Pain 5/10;                                                             

21:45  / 75; Pulse 124; Resp 18; Pulse Ox 100% on R/A;                                  kj2 

23:29  / 58; Pulse 95; Resp 18 S; Pulse Ox 98% on R/A;                                  br2 

01/10                                                                                             

00:14  / 71; Pulse 93; Resp 18 S; Pulse Ox 96% on R/A;                                  br2 

01:01  / 77; Pulse 87; Resp 18; Pulse Ox 93% on R/A;                                    br2 

01:57  / 72; Pulse 86; Resp 18; Pulse Ox 95% ;                                          br2 

                                                                                             

21:09 Body Mass Index 32.98 (113.40 kg, 185.42 cm)                                            br2 

                                                                                             

21:09 Pain Scale: Adult                                                                       br2 

                                                                                                  

MDM:                                                                                              

                                                                                             

21:13 Medical Screening Exam initiated                                                        cp  

01/10                                                                                             

02:30 Data reviewed: vital signs, nurses notes, lab test result(s), EKG, radiologic studies,  cp  

      plain films, and as a result, I will discharge patient.                                     

02:30 Differential diagnosis: arrythmia, dehydration, stress disorder, drug use, STEMI.       cp  

      Consideration of Admission/Observation Escalation of care including                         

      admission/observation considered. I considered the following discharge prescriptions or     

      medication management in the emergency department Medications were administered in the      

      Emergency Department. See MAR. Counseling: I had a detailed discussion with the patient     

      and/or guardian regarding the historical points, exam findings, and any diagnostic          

      results supporting the discharge/admit diagnosis, lab results, radiology results,           

      illegal drug use. Response to treatment: the patient's symptoms have markedly improved      

      after treatment, and as a result, I will discharge patient. Special discussion: Based       

      on the patient's history, exam, and Dx evaluation, there is no indication for emergent      

      intervention or inpatient Tx. It is understood by the patient/guardian that if the Sx's     

      persist or worsen they need to return immediately for re-evaluation.                        

                                                                                                  

                                                                                             

21:11 Order name: Acetaminophen; Complete Time: 22:31                                         cp  

                                                                                             

21:11 Order name: Basic Metabolic Panel; Complete Time: :31                                 cp  

                                                                                             

22:31 Interpretation: Normal except: K 3.0; GLUC 152; CA 7.8.                                 cp  

                                                                                             

21:11 Order name: CBC with Diff; Complete Time: :31                                         cp  

                                                                                             

22:31 Interpretation: Normal except: EOSINOPHIL % 6.3; BASO% 2.4.                             cp  

                                                                                             

21:11 Order name: ETOH Level; Complete Time: 22:31                                            cp  

                                                                                             

23:22 Interpretation: Reviewed.                                                                 

                                                                                             

21:11 Order name: Hepatic Function; Complete Time: :31                                      cp  

                                                                                             

21:11 Order name: PT-INR; Complete Time: 22:31                                                cp  

                                                                                             

21:11 Order name: Ptt, Activated; Complete Time: 22:31                                        cp  

                                                                                             

21:11 Order name: Salicylate; Complete Time: 22:31                                            cp  

                                                                                             

21:11 Order name: Urinalysis w/ reflexes; Complete Time: 23:19                                cp  

01/10                                                                                             

02:23 Interpretation: Reviewed.                                                               cp  

                                                                                             

21:11 Order name: Urine Drug Screen; Complete Time: 23:19                                     cp  

                                                                                             

23:21 Interpretation: VIKY POSITIVE; Reviewed.                                                 cp  

                                                                                             

21:11 Order name: Magnesium; Complete Time: 22:31                                             cp  

                                                                                             

21:11 Order name: Troponin HS; Complete Time: 22:31                                           cp  

01/10                                                                                             

01:16 Order name: Troponin HS; Complete Time: 02:23                                           cp  

                                                                                             

21:11 Order name: XRAY Chest (1 view); Complete Time: 22:31                                   cp  

                                                                                             

21:11 Order name: EKG; Complete Time: 21:12                                                   cp  

                                                                                             

21:11 Order name: EKG - Nurse/Tech; Complete Time: 21:31                                      cp  

                                                                                             

21:11 Order name: IV Saline Lock; Complete Time: 21:32                                        cp  

                                                                                             

21:11 Order name: Labs collected and sent; Complete Time: 22:12                               cp  

                                                                                             

21:11 Order name: Suicide Screening ([place])                                                cp  

                                                                                             

21:11 Order name: Cardiac monitoring; Complete Time: 21:31                                    cp  

                                                                                             

21:11 Order name: O2 Per Protocol; Complete Time: 21:31                                       cp  

                                                                                             

21:11 Order name: O2 Sat Monitoring; Complete Time: 21:31                                     cp  

                                                                                                  

EC/09                                                                                             

21:10 Rate is 117 beats/min. Rhythm is regular. CA interval is normal. QRS interval is        cp  

      normal. QT interval is normal. T waves are Inverted in lead aVR. Interpreted by me.         

      Reviewed by me.                                                                             

                                                                                                  

Administered Medications:                                                                         

21:45 Drug: Ativan IVP 1 mg IVP once Route: IVP; Site: left antecubital;                      kj2 

22:45 Follow up: Response: No adverse reaction                                                br2 

21:45 Drug: NS 0.9% IV 1000 ml IV at 1000 ml once; to be given as a bolus over 60 minutes     kj2 

      Route: IV; Rate: 1000 ml; Site: left antecubital;                                           

23:00 Follow up: Response: No adverse reaction; IV Status: Completed infusion; IV Intake:     br2 

      1000ml                                                                                      

22:47 Drug: NS 0.9% IV 1000 ml IV at 1000 ml once; to be given as a bolus over 60 minutes     br2 

      Route: IV; Rate: 1000 ml; Site: left antecubital;                                           

01/10                                                                                             

00:05 Follow up: IV Status: Completed infusion; IV Intake: 1000ml                             br2 

                                                                                             

22:50 Drug: Potassium PO Effervescent Tablet 50 mEq PO once; dissolve in 4 ounces of water or br2 

      juice Route: PO;                                                                            

23:30 Follow up: Response: No adverse reaction                                                br2 

                                                                                                  

                                                                                                  

Disposition:                                                                                      

01/10                                                                                             

05:44 Co-signature as Attending Physician, Tarik Subramanian MD I agree with the assessment    sp4 

      and plan of care. I reviewed the patient's care provided by the [facility]           

      Provider and agree with the diagnosis and treatment plan.                                   

                                                                                                  

Disposition Summary:                                                                              

01/10/25 02:30                                                                                    

Discharge Ordered                                                                                 

 Notes:       Location: Home                                                                        
  cp

      Problem: new                                                                            cp  

      Symptoms: have improved                                                                 cp  

      Condition: Stable                                                                       cp  

      Diagnosis                                                                                   

        - Palpitations                                                                        cp  

        - Chest pain, unspecified                                                             cp  

        - Adverse effect of cocaine, initial encounter                                        cp  

      Followup:                                                                               cp  

        - With: Emergency Department                                                               

        - When: As needed                                                                          

        - Reason: Worsening of condition                                                           

      Discharge Instructions:                                                                     

        - Discharge Summary Sheet                                                             cp  

        - Nonspecific Chest Pain, Adult                                                       cp  

        - Cocaine Use Disorder                                                                cp  

        - Palpitations                                                                        cp  

      Forms:                                                                                      

        - Medication Reconciliation Form                                                      cp  

        - Antibiotic Education                                                                cp  

        - Prescription Opioid Use                                                             cp  

        - Patient Portal Instructions                                                         cp  

        - Leadership Thank You Letter                                                         cp  

Signatures:                                                                                       

Dispatcher [employer]                           EDMS                                                 

Markus Booth PA PA   cp                                                   

Tarik Subramanian MD MD   sp4                                                  

Carline Heaton RN                     RN   br2                                                  

Lisa Wood RN                     RN   kj2                                                  

                                                                                                  

Corrections: (The following items were deleted from the chart)                                    

                                                                                             

21:12 21:12 ACETAMINOPHEN+C.LAB.BRZ ordered. EDMS                                             EDMS

21:12 21:12 BASIC METABOLIC PANEL+C.LAB.BRZ ordered. EDMS                                     EDMS

21:12 21:12 CBC+H.LAB.BRZ ordered. EDMS                                                       EDMS

21:12 21:12 ETHANOL+C.LAB.BRZ ordered. EDMS                                                   EDMS

21:12 21:12 HEPATIC FUNCTION+C.LAB.BRZ ordered. EDMS                                          EDMS

21:12 21:12 PROTIME (+INR)+COAG.LAB.BRZ ordered. EDMS                                         EDMS

21:12 21:12 PTT, ACTIVATED+COAG.LAB.BRZ ordered. EDMS                                         EDMS

21:12 21:12 SALICYLATE+C.LAB.BRZ ordered. EDMS                                                EDMS

21: 21:12 Urinalysis+U.LAB.BRZ ordered. EDMS                                                EDMS

21:12 21:12 URINE DRUG SCREEN+UC.LAB.BRZ ordered. EDMS                                        EDMS

21: 21:12 MAGNESIUM+C.LAB.BRZ ordered. EDMS                                                 EDMS

: 21:12 Troponin High Sensitivity+C.LAB.BRZ ordered. EDMS                                 EDMS

01/10                                                                                             

01:21  21:15 Onset: The symptoms/episode began/occurred cp                               cp  

01/10                                                                                             

20:59 20:58 Data reviewed: vital signs, nurses notes, cp                                      cp  

                                                                                                  

**************************************************************************************************

## 2025-01-13 NOTE — EKG
Test Date:    2025-01-09               Test Time:    21:03:48

Technician:   AF                                     

                                                     

MEASUREMENT RESULTS:                                       

Intervals:                                           

Rate:         117                                    

AK:           166                                    

QRSD:         98                                     

QT:           348                                    

QTc:          485                                    

Axis:                                                

P:            50                                     

AK:           166                                    

QRS:          15                                     

T:            46                                     

                                                     

INTERPRETIVE STATEMENTS:                                       

                                                     

Sinus tachycardia

Otherwise normal ECG

Compared to ECG 11/26/2020 00:05:11

Sinus rhythm no longer present



Electronically Signed On 01-13-25 10:50:22 CST by Lio Valencia